# Patient Record
Sex: FEMALE | Race: NATIVE HAWAIIAN OR OTHER PACIFIC ISLANDER | Employment: UNEMPLOYED | URBAN - METROPOLITAN AREA
[De-identification: names, ages, dates, MRNs, and addresses within clinical notes are randomized per-mention and may not be internally consistent; named-entity substitution may affect disease eponyms.]

---

## 2020-07-21 ENCOUNTER — OFFICE VISIT (OUTPATIENT)
Dept: PRIMARY CARE CLINIC | Age: 70
End: 2020-07-21

## 2020-07-21 PROCEDURE — 99211 OFF/OP EST MAY X REQ PHY/QHP: CPT | Performed by: NURSE PRACTITIONER

## 2020-07-21 NOTE — PROGRESS NOTES
Lalobetty Marce received a viral test for COVID-19. They were educated on isolation and quarantine as appropriate. For any symptoms, they were directed to seek care from their PCP, given contact information to establish with a doctor, directed to an urgent care or the emergency room.

## 2020-07-28 LAB
SARS-COV-2: NOT DETECTED
SOURCE: NORMAL

## 2022-01-20 ENCOUNTER — OFFICE VISIT (OUTPATIENT)
Dept: ORTHOPEDIC SURGERY | Age: 72
End: 2022-01-20
Payer: COMMERCIAL

## 2022-01-20 VITALS — HEIGHT: 62 IN | WEIGHT: 130 LBS | BODY MASS INDEX: 23.92 KG/M2

## 2022-01-20 DIAGNOSIS — M17.11 PRIMARY OSTEOARTHRITIS OF RIGHT KNEE: ICD-10-CM

## 2022-01-20 DIAGNOSIS — M22.41 CHONDROMALACIA PATELLAE OF RIGHT KNEE: ICD-10-CM

## 2022-01-20 DIAGNOSIS — M25.561 ACUTE PAIN OF RIGHT KNEE: Primary | ICD-10-CM

## 2022-01-20 PROCEDURE — L1810 KO ELASTIC WITH JOINTS: HCPCS | Performed by: FAMILY MEDICINE

## 2022-01-20 PROCEDURE — G8400 PT W/DXA NO RESULTS DOC: HCPCS | Performed by: FAMILY MEDICINE

## 2022-01-20 PROCEDURE — G8484 FLU IMMUNIZE NO ADMIN: HCPCS | Performed by: FAMILY MEDICINE

## 2022-01-20 PROCEDURE — G8427 DOCREV CUR MEDS BY ELIG CLIN: HCPCS | Performed by: FAMILY MEDICINE

## 2022-01-20 PROCEDURE — 1123F ACP DISCUSS/DSCN MKR DOCD: CPT | Performed by: FAMILY MEDICINE

## 2022-01-20 PROCEDURE — 4040F PNEUMOC VAC/ADMIN/RCVD: CPT | Performed by: FAMILY MEDICINE

## 2022-01-20 PROCEDURE — 99203 OFFICE O/P NEW LOW 30 MIN: CPT | Performed by: FAMILY MEDICINE

## 2022-01-20 PROCEDURE — 1090F PRES/ABSN URINE INCON ASSESS: CPT | Performed by: FAMILY MEDICINE

## 2022-01-20 PROCEDURE — 20610 DRAIN/INJ JOINT/BURSA W/O US: CPT | Performed by: FAMILY MEDICINE

## 2022-01-20 PROCEDURE — 3017F COLORECTAL CA SCREEN DOC REV: CPT | Performed by: FAMILY MEDICINE

## 2022-01-20 PROCEDURE — G8420 CALC BMI NORM PARAMETERS: HCPCS | Performed by: FAMILY MEDICINE

## 2022-01-20 PROCEDURE — 1036F TOBACCO NON-USER: CPT | Performed by: FAMILY MEDICINE

## 2022-01-20 RX ORDER — MELOXICAM 15 MG/1
15 TABLET ORAL DAILY
Qty: 30 TABLET | Refills: 3 | Status: SHIPPED | OUTPATIENT
Start: 2022-01-20

## 2022-01-20 RX ORDER — BETAMETHASONE SODIUM PHOSPHATE AND BETAMETHASONE ACETATE 3; 3 MG/ML; MG/ML
12 INJECTION, SUSPENSION INTRA-ARTICULAR; INTRALESIONAL; INTRAMUSCULAR; SOFT TISSUE ONCE
Status: COMPLETED | OUTPATIENT
Start: 2022-01-20 | End: 2022-01-20

## 2022-01-20 RX ORDER — ALENDRONATE SODIUM 35 MG/1
35 TABLET ORAL
COMMUNITY
Start: 2021-10-11

## 2022-01-20 RX ORDER — BUPIVACAINE HYDROCHLORIDE 2.5 MG/ML
2 INJECTION, SOLUTION INFILTRATION; PERINEURAL ONCE
Status: COMPLETED | OUTPATIENT
Start: 2022-01-20 | End: 2022-01-20

## 2022-01-20 RX ORDER — LIDOCAINE HYDROCHLORIDE 10 MG/ML
1 INJECTION, SOLUTION INFILTRATION; PERINEURAL ONCE
Status: COMPLETED | OUTPATIENT
Start: 2022-01-20 | End: 2022-01-20

## 2022-01-20 RX ORDER — MELOXICAM 15 MG/1
15 TABLET ORAL DAILY
COMMUNITY
Start: 2021-08-24

## 2022-01-20 RX ADMIN — LIDOCAINE HYDROCHLORIDE 1 ML: 10 INJECTION, SOLUTION INFILTRATION; PERINEURAL at 13:44

## 2022-01-20 RX ADMIN — BETAMETHASONE SODIUM PHOSPHATE AND BETAMETHASONE ACETATE 12 MG: 3; 3 INJECTION, SUSPENSION INTRA-ARTICULAR; INTRALESIONAL; INTRAMUSCULAR; SOFT TISSUE at 13:44

## 2022-01-20 RX ADMIN — BUPIVACAINE HYDROCHLORIDE 5 MG: 2.5 INJECTION, SOLUTION INFILTRATION; PERINEURAL at 13:44

## 2022-01-20 NOTE — PROGRESS NOTES
Chief Complaint  Knee Pain (N RIGHT KNEE)      Initial consultation persistent right anterior and lateral greater than medial knee pain with difficulty walking    History of Present Illness:  Tracy Ramirez is a 80 y.o. female is a very pleasant  female who speaks primarily [place] who is recently retired after working in a [employer]. and is a very nice patient of Dr. Andrew Charlton who is being seen today in kind consultation from Dr. Andrew Charlton for evaluation of persistence of pain to the right anterior and lateral greater than medial knee pain. She states that since roughly October 2021, she began noticed the insidious onset of pain to the anterior lateral greater than medial portion of her right knee. There is no history of injury trauma or fall prior to becoming symptomatic. She describes her pain is more achy but is a difficult time rating this with more of her sharp pain associated with positional changes after sitting climbing stairs and if she is unable to squat kneel due to pain. She did recently see Dr. Andrew Charlton in the office who did obtain a 3 view knee films which did suggest more mild arthropathy. She has had little in the way of treatment and does complain of crepitation and popping and has had subtle buckling and cracking but no active locking or catching. Being seen today for orthopedic and sports consultation with updated weightbearing imaging. Medical History     Patient's medications, allergies, past medical, surgical, social and family histories were reviewed and updated as appropriate. Review of Systems  Pertinent items are noted in HPI  Review of systems reviewed from Patient History Form dated on 1/20/2022 and available in the patient's chart under the Media tab. Vital Signs  There were no vitals filed for this visit.     General Exam:     Constitutional: Patient is adequately groomed with no evidence of malnutrition  DTRs: Deep tendon reflexes are intact  Mental Status: The patient is oriented to time, place and person. The patient's mood and affect are appropriate. Lymphatic: The lymphatic examination bilaterally reveals all areas to be without enlargement or induration. Vascular: Examination reveals no swelling or calf tenderness. Peripheral pulses are palpable and 2+. Neurological: The patient has good coordination. There is no weakness or sensory deficit. Knee Examination  Inspection: There is no high-grade deformity although she may be in slight valgus to about 10 degrees. Does have trace knee joint effusion with patellofemoral lateral compartment crepitation. Palpation: Does have clinical tenderness over the lateral greater than medial joint line remarkable for crepitation and tenderness over the medial and lateral patellofemoral facets. Rang of Motion: She is able to completely extend and flexion is mildly painful beyond 115. Hamstrings somewhat tight. Fair quad tone. Strength: Strength testing 4-5 with flexion extension. Special Tests: She does have pain patellar grind testing reproducing portion of her pain and pain with crepitation with lateral greater than medial Robyn's. No high-grade medial meniscal click. I do not sense high-grade instability. Hamstrings are tight. Mild hip weakness as well. Skin: There are no rashes, ulcerations or lesions. Distal neurovascular exam is intact. Gait: Reasonable gait. Reflex symmetrically preserved    Additional Comments:     Additional Examinations:  Contralateral Exam: Examination of the left knee reveals intact skin. There is no focal tenderness. The patient demonstrates full painless range of motion with regards to flexion and extension. Strength is 5/5 thorough out all planes. Ligamentous stability is grossly intact. Right Lower Extremity: Examination of the right lower extremity does not show any tenderness, deformity or injury.   Range of motion is unremarkable. There is no gross instability. There are no rashes, ulcerations or lesions. Strength and tone are normal.  Left Lower Extremity: Examination of the left lower extremity does not show any tenderness, deformity or injury. Range of motion is unremarkable. There is no gross instability. There are no rashes, ulcerations or lesions. Strength and tone are normal.      Diagnostic Test Findings: Right knee AP lateral and sunrise films were reviewed from Dr. Joann Swain office at [facility] on 1/11/2022 does suggest patellofemoral arthropathy with some patella amy as well as lateral compartment spurring. We did do a weightbearing tunnel view today which does show fairly advanced near bone-on-bone changes to her lateral compartment. Assessment : #1.  3-month status post symptomatic aggravation significant her right knee osteoarthritis with near bone-on-bone changes lateral compartment with patellofemoral arthropathy    Impression:  Encounter Diagnoses   Name Primary?  Acute pain of right knee Yes    Primary osteoarthritis of right knee     Chondromalacia patellae of right knee        Office Procedures:  Orders Placed This Encounter   Procedures    XR KNEE RIGHT (1-2 VIEWS)     Standing Status:   Future     Number of Occurrences:   1     Standing Expiration Date:   1/20/2023    Ambulatory referral to Physical Therapy     Referral Priority:   Routine     Referral Type:   Eval and Treat     Referral Reason:   Specialty Services Required     Requested Specialty:   Physical Therapy     Number of Visits Requested:   1    W[place] ARTHROCENTESIS ASPIR&/INJ MAJOR JT/BURSA W/O US    EUFLEXXA INJ PER DOSE     RIGHT KNEE     Standing Status:   Future     Standing Expiration Date:   1/20/2023   Fernandez Breg / DJO Economy Hinged Knee Brace     Patient was prescribed an Economy Hinged Knee Brace.  The right knee will require stabilization / immobilization from this semi-rigid / rigid orthosis to improve their function. The orthosis will assist in protecting the affected area, provide functional support and facilitate healing. The patient was educated and fit by a healthcare professional with expert knowledge and specialization in brace application while under the direct supervision of the treating physician. Verbal and written instructions for the use of and application of this item were provided. They were instructed to contact the office immediately should the brace result in increased pain, decreased sensation, increased swelling or worsening of the condition. Treatment Plan:  Treatment options were discussed with Ana Paula Kirkpatrick. We did review her updated weightbearing knee films and exam findings. She does have fairly advanced lateral compartment arthropathy with symptomatic patellofemoral arthropathy to her right knee. Her degenerative changes are much more apparent on her weightbearing tunnel view. After discussing potential treatment options, we did inject her right knee today using 2 cc of Celestone, 2 cc of Marcaine, 1 cc of Xylocaine. We will start her on meloxicam 15 mg daily and did place her a wraparound knee brace. We will start her on a short course of physical therapy but it may be advisable to have a  present for therapy sessions to ensure that she is properly performing her patella protection program.  Icing and activity modification and potential for viscosupplementation was also discussed. We will see her back in a few weeks for follow-up. They will contact us in the interim with questions or concerns would like to avoid surgery if at all possible        This dictation was performed with a verbal recognition program (DRAGON) and it was checked for errors. It is possible that there are still dictated errors within this office note. If so, please bring any errors to my attention for an addendum. All efforts were made to ensure that this office note is accurate.

## 2022-01-24 ENCOUNTER — HOSPITAL ENCOUNTER (OUTPATIENT)
Dept: PHYSICAL THERAPY | Age: 72
Setting detail: THERAPIES SERIES
Discharge: HOME | End: 2022-01-24
Payer: COMMERCIAL

## 2022-01-24 PROCEDURE — 97110 THERAPEUTIC EXERCISES: CPT | Performed by: PHYSICAL THERAPIST

## 2022-01-24 PROCEDURE — 97112 NEUROMUSCULAR REEDUCATION: CPT | Performed by: PHYSICAL THERAPIST

## 2022-01-24 PROCEDURE — 97161 PT EVAL LOW COMPLEX 20 MIN: CPT | Performed by: PHYSICAL THERAPIST

## 2022-01-24 NOTE — PLAN OF CARE
[facility], [address] [place] [address], [address] [place] [address]  Phone: [phone]   Fax: [phone]                                                              Physical Therapy Certification    Dear Referring Practitioner: Kenny Hendrix MD,    We had the pleasure of evaluating the following patient for physical therapy services at [address] [place] [address]. A summary of our findings can be found in the initial assessment below. This includes our plan of care. If you have any questions or concerns regarding these findings, please do not hesitate to contact me at the office phone number checked above.   Thank you for the referral.       Physician Signature:_______________________________Date:__________________  By signing above (or electronic signature), therapists plan is approved by physician      Patient: Tracy Ramirez   : 1950   MRN: [id number]  Referring Physician: Referring Practitioner: Kenny Hendrix MD      Evaluation Date: 2022      Medical Diagnosis Information:  Diagnosis: M25.561 (ICD-10-CM) - Acute pain of right kneeM17.11 (ICD-10-CM) - Primary osteoarthritis of right kneeM22.41 (ICD-10-CM) - Chondromalacia patellae of right knee   Treatment Diagnosis: M25.561 (ICD-10-CM) - Acute pain of right knee; M62.81 muscle weakness                                           Precautions/ Contra-indications: OA; Current R sided low back pain into her R glute    C-SSRS Triggered by Intake questionnaire (Past 2 wk assessment):   [x] No, Questionnaire did not trigger screening.   [] Yes, Patient intake triggered further evaluation      [] C-SSRS Screening completed  [] PCP notified via Plan of Care  [] Emergency services notified     Latex Allergy:  [x]NO      []YES  Preferred Language for Healthcare:   []English       [x]other: [place]- using Ray Martinez (COLILI #[id number], session [id number]) for eval and tx    SUBJECTIVE: Patient stated complaint: pt arrives with  today. Pt speaks [place] so  through [employer] was utilized throughout eval and tx. Pt presents with R knee pain. Pain has been present since Oct 2021. No injury or trauma. Per MD note: Right knee AP lateral and sunrise films were reviewed from Dr. Sandy Siddiqui office at [facility] on 1/11/2022 does suggest patellofemoral arthropathy with some patella amy as well as lateral compartment spurring. We did do a weightbearing tunnel view today which does show fairly advanced near bone-on-bone changes to her lateral compartment. Pt was given a cortisone injection at MD appt and this did help. She was also given a wrap around knee brace per MD note but was not wearing today. Pt also complains of tightness in her calves that has been going on also since last year. She feels this more when she is standing for a long time doing house work. She has been stretching her calves and reports she has had no increase in swelling in feet or lower legs. Relevant Medical History:OA; Current R sided low back pain into her R glute  Functional Disability Index: WOMAC- 76.04%  Relevant Meds:   Meloxicam  Current pain: 6/10 Both sides of R knee and front of knee  Pain at worst:  6/10  Pain at best:  Does get less with medication    Easing factors: medications and cortisone injection  Provocative factors:  climbing stairs, squat, kneel, walking, standing for a long period of time. Feels like her knee is locked at night when she sleeps      Type: [x]Constant   []Intermittent  []Radiating [x]Localized []other:     Numbness/Tingling: none    Functional Limitations/Impairments: []Sitting []Standing [x]Walking    [x]Squatting/bending  [x]Stairs           []ADL's  [x]Transfers []Sports/Recreation []Other:    Occupation/School: retired after working in [employer] ..  Cooking and cleaning at home (will get tight in her calves with this)     Living Status/Prior Level of Function: Independent with ADLs and IADLs, without pain in R knee  (insert highest prior level of function)    OBJECTIVE:     Joint mobility:    [x]Normal    []Hypo   []Hyper    Palpation: mild tender throughout R anterior knee, medial and lateral to patella. Non tender posterior knee.  Tender lateral hip proximal to greater trochanter    Functional Mobility/Transfers: Indep    Posture: mild increased knee valgus    Bandages/Dressings/Incisions: n/a    Gait: (include devices/WB status) Indep and non-antalgic    Single leg stance eyes open- R: 30 secs with HHA ;L: 30 secs with HHA no pain B   SLS eyes closed- R: ; L:     Squats: x 5 with good form    Quad recruitment: decreased tone and activation B        Flexibility L R Comment   Hamstring good good 90/90 position   Gastroc Good motion but pt reports feels tight Good motion but pt reports feels tight    ITB      Quad 1 in No pain in knee but pain into R side low back, 3 in Heel to buttock distance             ROM PROM AROM Overpressure Comment    L R L R L R    Flexion   140 138      Extension   3 3                              Strength L R Comment   Quad 5 4+ mild pain posterior-lateral R hip    Hamstring 5 4+    Gastroc      Hip flexor 4+ 4; Mild pain posterior-lateral R hip    Hip ABD 4 4    Hip extn              Orthopedic Special Tests:  Special Test Results/Comment   Meniscal Click/Robyn's    Crepitus    Flexion Test    Valgus Laxity    Varus Laxity    Lachmans    Drop Back    Homans Wells Clinical Decision Rule for DVT     Clinical Presentation  Possible Score  Clients Score    Active cancer (within 6 months of diagnosis or receiving palliative care)  1   0   Paralysis, paresis, or recent immobilization of lower extremity  1   0   Bedridden for more than 3 days or major surgery in the last 4 weeks  1   0   Localized tenderness in the center of the posterior calf, the popliteal space, or along the femoral vein in the anterior thigh/groin  1   0 not localized   Entire lower extremity swelling  1   0   Unilateral calf swelling (more than 3 cm larger than uninvolved side)  1   0   Unilateral pitting edema  1   0   Collateral superficial veins (nonvaricose)  1   varicose B   An alternative diagnosis is as likely (or more likely) than DVT (e.g., cellulitis, postoperative swelling, calf strain)  -2   -2   Total Points    -2      Key  · -2 to 0 Low probability of DVT 3% (95% confidence interval [CI] 1.7%-5.9%)  · 1 to 2 Moderate probability of DVT 17% (95% confidence interval [CI] 12%-23%)  · 3 or more High probability of DVT 75% (95% confidence interval [CI] 63%-84%)     Medical consultation is advised in the presence of low probability  Medical referral is required with moderate or high score. Sterling PS, Herman CAMEJO, Chasidy J, et al: Value of assessment of pretest probability of deep-vein thrombosis in clinical management, Lancet 287:2000-7982, 1997. Girth L R   Mid Patella     Suprapatellar     5cm above     15cm above                              [x] Patient history, allergies, meds reviewed. Medical chart reviewed. See intake form. Review Of Systems (ROS):  [x]Performed Review of systems (Integumentary, CardioPulmonary, Neurological) by intake and observation. Intake form has been scanned into medical record. Patient has been instructed to contact their primary care physician regarding ROS issues if not already being addressed at this time.       Co-morbidities/Complexities (which will affect course of rehabilitation):   []None           Arthritic conditions   []Rheumatoid arthritis (M05.9)  [x]Osteoarthritis (M19.91)   Cardiovascular conditions   []Hypertension (I10)  []Hyperlipidemia (E78.5)  []Angina pectoris (I20)  []Atherosclerosis (I70)   Musculoskeletal conditions   []Disc pathology   []Congenital spine pathologies   []Prior surgical intervention  []Osteoporosis (M81.8)  []Osteopenia (M85.8) Endocrine conditions   []Hypothyroid (E03.9)  []Hyperthyroid Gastrointestinal conditions   []Constipation (F59.38)   Metabolic conditions   []Morbid obesity (E66.01)  []Diabetes type 1(E10.65) or 2 (E11.65)   []Neuropathy (G60.9)     Pulmonary conditions   []Asthma (J45)  []Coughing   []COPD (J44.9)   Psychological Disorders  []Anxiety (F41.9)  []Depression (F32.9)   []Other:   []Other:          Barriers to/and or personal factors that will affect rehab potential:              [x]Age  []Sex              []Motivation/Lack of Motivation                        []Co-Morbidities              [x]Cognitive Function, education/learning barriers- language barrier/required use of                []Environmental, home barriers              []profession/work barriers  []past PT/medical experience  []other:  Justification:     Falls Risk Assessment (30 days):   [x] Falls Risk assessed and no intervention required. [] Falls Risk assessed and Patient requires intervention due to being higher risk   TUG score (>12s at risk):     [] Falls education provided, including         ASSESSMENT: Pt is a 80y.o. year old female with signs and symptoms consistent with R knee OA and chondromalacia patella. Pt presents with decreased strength; increased pain; decreased flexibility; and decreased functional mobility and ADLs. Pt will benefit from skilled physical therapy services to address above limitations through strengthening, stretching, modalities as need for pain control and swelling, instruction on HEP, and education for return to functional mobility.      Functional Impairments:     []Noted lumbar/proximal hip/LE joint hypomobility   []Decreased LE functional ROM   [x]Decreased core/proximal hip strength and neuromuscular control   [x]Decreased LE functional strength   [x]Reduced balance/proprioceptive control   []other:      Functional Activity Limitations (from functional questionnaire and intake)   [x]Reduced ability to tolerate prolonged functional positions   [x]Reduced ability or difficulty with changes of positions or transfers between positions   []Reduced ability to maintain good posture and demonstrate good body mechanics with sitting, bending, and lifting   [x]Reduced ability to sleep   [] Reduced ability or tolerance with driving and/or computer work   [x]Reduced ability to perform lifting, carrying tasks   [x]Reduced ability to squat   []Reduced ability to forward bend   [x]Reduced ability to ambulate prolonged functional periods/distances/surfaces   [x]Reduced ability to ascend/descend stairs   [x]Reduced ability to run, hop, cut or jump   []other:    Participation Restrictions   [x]Reduced participation in self care activities   [x]Reduced participation in home management activities   []Reduced participation in work activities   []Reduced participation in social activities. []Reduced participation in sport/recreation activities. Classification :    []Signs/symptoms consistent with post-surgical status including decreased ROM, strength and function.    []Signs/symptoms consistent with joint sprain/strain   [x]Signs/symptoms consistent with patella-femoral syndrome   [x]Signs/symptoms consistent with knee OA/hip OA   []Signs/symptoms consistent with internal derangement of knee/Hip   []Signs/symptoms consistent with functional hip weakness/NMR control      []Signs/symptoms consistent with tendinitis/tendinosis    []signs/symptoms consistent with pathology which may benefit from Dry needling      []other:      Prognosis/Rehab Potential:      []Excellent   [x]Good    []Fair   []Poor    Tolerance of evaluation/treatment:    []Excellent   [x]Good    []Fair   []Poor    PLAN  Frequency/Duration:  1 days per week for 6 Weeks:  Interventions:  [x]  Therapeutic exercise including: strength training, ROM, for Lower extremity and core   [x]  NMR activation and proprioception for LE, Glutes and Core   [x]  Manual therapy as indicated for LE, Hip and spine to include: Dry Needling/IASTM, STM, PROM, Gr I-IV mobilizations, manipulation. [x] Modalities as needed that may include: thermal agents, E-stim, Biofeedback, US, iontophoresis as indicated  [x] Patient education on joint protection, postural re-education, activity modification, progressio      HEP instruction: Pt was instructed in, and safely and correctly demonstrated home exercise program. Patient verbalized understanding of proper frequency of exercises. Copy of exercises was scanned into patient chart and can be fount in the media file. Access Code: [email or web address]  URL: [email or web address]  Date: 01/24/2022  Prepared by: Iglesia Cormier    Exercises  Gastroc Stretch on Wall - 2 x daily - 7 x weekly - 5 reps - 1 sets - 30 hold  Standing Gastroc Stretch on Step with Counter Support - 2 x daily - 7 x weekly - 1 sets - 5 reps - 30 hold  Long Sitting Quad Set - 2 x daily - 7 x weekly - 1 sets - 10 reps - 10 hold  Supine Straight Leg Raises - 1 x daily - 7 x weekly - 10 reps - 3 sets  Clamshell - 1 x daily - 7 x weekly - 10 reps - 3 sets  Supine Hip Adduction Isometric with Ball - 1 x daily - 7 x weekly - 10 reps - 1 sets - 10 hold  Standing Hip Abduction with Counter Support - 1 x daily - 7 x weekly - 3 sets - 10 reps      GOALS:  Patient stated goal: decrease pain in knee    Short Term Goals: To be achieved in: 2 weeks  1. Independent in HEP and progression per patient tolerance, in order to prevent re-injury. [] Progressing: [] Met: [] Not Met: [] Adjusted   2. Patient will have a decrease in pain to facilitate improvement in movement, function, and ADLs as indicated by Functional Deficits. [] Progressing: [] Met: [] Not Met: [] Adjusted    Long Term Goals: To be achieved in: 6 weeks  1. Disability index score of 62% or less for the [facility] to assist with reaching prior level of function. [] Progressing: [] Met: [] Not Met: [] Adjusted  2.  Patient will demonstrate an increase in R knee strength to 5/5 flex and extn in LE to allow for proper functional mobility as indicated by patients Functional Deficits. [] Progressing: [] Met: [] Not Met: [] Adjusted  3. Patient will return to laying in bed at night with pain less than 3/10. [] Progressing: [] Met: [] Not Met: [] Adjusted  4. Pt will return to going up and down stairs with pain less than 3/10  [] Progressing: [] Met: [] Not Met: [] Adjusted       Physical Therapy Evaluation Complexity Justification  [x] A history of present problem with:  [] no personal factors and/or comorbidities that impact the plan of care;  [x]1-2 personal factors and/or comorbidities that impact the plan of care  []3 personal factors and/or comorbidities that impact the plan of care  [x] An examination of body systems using standardized tests and measures addressing any of the following: body structures and functions (impairments), activity limitations, and/or participation restrictions;:  [] a total of 1-2 or more elements   [] a total of 3 or more elements   [x] a total of 4 or more elements   [x] A clinical presentation with:  [x] stable and/or uncomplicated characteristics   [] evolving clinical presentation with changing characteristics  [] unstable and unpredictable characteristics;   [x] Clinical decision making of [x] low, [] moderate, [] high complexity using standardized patient assessment instrument and/or measurable assessment of functional outcome.     [x] EVAL (LOW) 89412 (typically 20 minutes face-to-face)  [] EVAL (MOD) 04574 (typically 30 minutes face-to-face)  [] EVAL (HIGH) 92096 (typically 45 minutes face-to-face)  [] RE-EVAL 73561      Electronically signed by:  Pietro Coyle, PT, PT, DPT

## 2022-01-24 NOTE — FLOWSHEET NOTE
[address] [place] [address]  and [facility], [place]                                                         Physical Therapy Daily Treatment Note  Date:  2022    Patient Name:  Pepe Arnold    :  1950  MRN: [id number]    Medical/Treatment Diagnosis Information:  · Diagnosis: M25.561 (ICD-10-CM) - Acute pain of right kneeM17.11 (ICD-10-CM) - Primary osteoarthritis of right kneeM22.41 (ICD-10-CM) - Chondromalacia patellae of right knee  · Treatment Diagnosis: M25.561 (ICD-10-CM) - Acute pain of right knee; M62.81 muscle weakness  Insurance/Certification information:  PT Insurance Information: [employer]  Physician Information:  Referring Practitioner: Oscar Haywood MD  Has the plan of care been signed (Y/N):        []  Yes  [x]  No     Date of Patient follow up with Physician: few weeks      Is this a Progress Report:     []  Yes  [x]  No        If Yes:  Date Range for reporting period:  Beginning- 2022   Ending    Progress report will be due (10 Rx or 30 days whichever is less): 10 visits or 3/51/70       Recertification will be due (POC Duration  / 90 days whichever is less): as above        Visit # Insurance Allowable Auth Required   1 30, needs auth after 30 visits []  Yes [x]  No        Functional Scale: WOMAC- 76.04%    Date assessed:  2022      Latex Allergy:  [x]NO      []YES  Preferred Language for Healthcare:   []English       [x]other:Cambodian- using [employer] ([facility] #[id number], session [id number]) for eval and tx      Pain level:  6/10  on 2022    SUBJECTIVE:  See eval    OBJECTIVE: See eval   Observation:    Test measurements:      Joint mobility:               [x]? Normal                       []?Hypo              []?Hyper     Palpation: mild tender throughout R anterior knee, medial and lateral to patella. Non tender posterior knee.  Tender lateral hip proximal to greater trochanter     Functional Mobility/Transfers: Indep     Posture: mild increased knee valgus     Bandages/Dressings/Incisions: n/a     Gait: (include devices/WB status) Indep and non-antalgic     Single leg stance eyes open- R: 30 secs with HHA ;L: 30 secs with HHA no pain B              SLS eyes closed- R: ; L:      Squats: x 5 with good form     Quad recruitment: decreased tone and activation B           Flexibility L R Comment   Hamstring good good 90/90 position   Gastroc Good motion but pt reports feels tight Good motion but pt reports feels tight     ITB         Quad 1 in No pain in knee but pain into R side low back, 3 in Heel to buttock distance                             ROM PROM AROM Overpressure Comment     L R L R L R     Flexion     140 138         Extension     3 3                                                   Strength L R Comment   Quad 5 4+ mild pain posterior-lateral R hip     Hamstring 5 4+     Gastroc         Hip flexor 4+ 4; Mild pain posterior-lateral R hip     Hip ABD 4 4     Hip extn                      RESTRICTIONS/PRECAUTIONS: OA; Current R sided low back pain into her R glute    Exercises/Interventions:     Exercise/Equipment Resistance/Repetitions Other comments   Stretching     Hamstring     Hip Flexor     ITB     Grion     Quad     Inclined Calf Shown runners stretch or off side of step for HEP 3 x 30 secs    Towel Pull          SLR     Hip flexion 3 x 10 Increased cuing to keep knee straight, difficult for pt to maintain   Hip extension     Hip Abduction Standing 3 x 10 B    Hip Adducton     SLR+     Clams 3 x 10 R                   Isometrics     Quad sets 5 x 10 secs    Hip Adduction 5 x 10 secs    Hamstring                    Patellar Glides     Medial     Superior     Inferior          ROM     Sheet Pulls     Wall Slides     Edge of bed     Flexionator     Extensionator     Hang Weights     Ankle Pumps                              CKC     Calf raises     Wall sits     Step ups Step up and over     Lateral Step Downs               Mini squats     CC TKE          Lateral band walks     Side Planks     Half moon     Single leg dead lift          Biodex-balance     Single leg stance     Plyoback          Stool scoots     SB bridge     SB HS Curls     Planks          PRE     Extension  RANGE: 90/40   Flexion  RANGE: 0/90        Cable Column          Leg Press  RANGE: 70/10        Bike     Treadmill                 Patient education: Pt was educated on PT diagnosis, prognosis, and plan of care. Reviewed insurance benefits for physical therapy in an outpatient hospital based setting with the patient, including allowable visit number. Therapeutic Exercise and NMR EXR  [x] (76196) Provided verbal/tactile cueing for activities related to strengthening, flexibility, endurance, ROM for improvements in LE, proximal hip, and core control with self care, mobility, lifting, ambulation.  [] (86629) Provided verbal/tactile cueing for activities related to improving balance, coordination, kinesthetic sense, posture, motor skill, proprioception  to assist with LE, proximal hip, and core control in self care, mobility, lifting, ambulation and eccentric single leg control.      NMR and Therapeutic Activities:    [x] (64726 or 09275) Provided verbal/tactile cueing for activities related to improving balance, coordination, kinesthetic sense, posture, motor skill, proprioception and motor activation to allow for proper function of core, proximal hip and LE with self care and ADLs  [] (18723) Gait Re-education- Provided training and instruction to the patient for proper LE, core and proximal hip recruitment and positioning and eccentric body weight control with ambulation re-education including up and down stairs     Home Exercise Program:    [x] (85302) Reviewed/Progressed HEP activities related to strengthening, flexibility, endurance, ROM of core, proximal hip and LE for functional self-care, mobility, lifting and ambulation/stair navigation   [] (95478)Reviewed/Progressed HEP activities related to improving balance, coordination, kinesthetic sense, posture, motor skill, proprioception of core, proximal hip and LE for self care, mobility, lifting, and ambulation/stair navigation    Access Code: [email or web address]  URL: [email or web address]  Date: 01/24/2022  Prepared by: Marlene Baum     Exercises  Gastroc Stretch on Wall - 2 x daily - 7 x weekly - 5 reps - 1 sets - 30 hold  Standing Gastroc Stretch on Step with Counter Support - 2 x daily - 7 x weekly - 1 sets - 5 reps - 30 hold  Long Sitting Quad Set - 2 x daily - 7 x weekly - 1 sets - 10 reps - 10 hold  Supine Straight Leg Raises - 1 x daily - 7 x weekly - 10 reps - 3 sets  Clamshell - 1 x daily - 7 x weekly - 10 reps - 3 sets  Supine Hip Adduction Isometric with Ball - 1 x daily - 7 x weekly - 10 reps - 1 sets - 10 hold  Standing Hip Abduction with Counter Support - 1 x daily - 7 x weekly - 3 sets - 10 reps  Manual Treatments:  PROM / STM / Oscillations-Mobs:  G-I, II, III, IV (PA's, Inf., Post.)  [] (97632) Provided manual therapy to mobilize LE, proximal hip and/or LS spine soft tissue/joints for the purpose of modulating pain, promoting relaxation,  increasing ROM, reducing/eliminating soft tissue swelling/inflammation/restriction, improving soft tissue extensibility and allowing for proper ROM for normal function with self care, mobility, lifting and ambulation. Modalities:      Charges:  Timed Code Treatment Minutes: 25   Total Treatment Minutes: 58     [x] EVAL (LOW) 39319   [] EVAL (MOD) 86602   [] EVAL (HIGH) 62277   [] RE-EVAL   [x] UV(24339) x   1  [] IONTO  [x] NMR (21008) x  1   [] VASO  [] Manual (33484) x      [] Other:  [] TA x      [] Mech Traction (21049)  [] ES(attended) (54389)      [] ES (un) (07825):       GOALS: Patient stated goal: decrease pain in knee     Short Term Goals: To be achieved in: 2 weeks  1.  Independent in HEP and progression per patient tolerance, in order to prevent re-injury. []? Progressing: []? Met: []? Not Met: []? Adjusted   2. Patient will have a decrease in pain to facilitate improvement in movement, function, and ADLs as indicated by Functional Deficits. []? Progressing: []? Met: []? Not Met: []? Adjusted     Long Term Goals: To be achieved in: 6 weeks  1. Disability index score of 62% or less for the [facility] to assist with reaching prior level of function. []? Progressing: []? Met: []? Not Met: []? Adjusted  2. Patient will demonstrate an increase in R knee strength to 5/5 flex and extn in LE to allow for proper functional mobility as indicated by patients Functional Deficits. []? Progressing: []? Met: []? Not Met: []? Adjusted  3. Patient will return to laying in bed at night with pain less than 3/10. []? Progressing: []? Met: []? Not Met: []? Adjusted  4. Pt will return to going up and down stairs with pain less than 3/10  []? Progressing: []? Met: []? Not Met: []? Adjusted          Overall Progression Towards Functional goals/ Treatment Progress Update:  [] Patient is progressing as expected towards functional goals listed. [] Progression is slowed due to complexities/Impairments listed. [] Progression has been slowed due to co-morbidities.   [x] Plan just implemented, too soon to assess goals progression <30days   [] Goals require adjustment due to lack of progress  [] Patient is not progressing as expected and requires additional follow up with physician  [] Other    Prognosis for POC: [x] Good [] Fair  [] Poor      Patient requires continued skilled intervention: [x] Yes  [] No      ASSESSMENT:  See eval    Treatment/Activity Tolerance:  [x] Patient tolerated treatment well [] Patient limited by fatique  [] Patient limited by pain  [] Patient limited by other medical complications  [] Other:       Return to Play: (if applicable)   []  Stage 1: Intro to Strength   []  Stage 2: Return to [facility] and Strength   []  Stage 3: Return to Jump and Strength   []  Stage 4: Dynamic Strength and Agility   []  Stage 5: Sport Specific Training     []  Ready to Return to Play, Meets All Above Stages   []  Not Ready for Return to Sports   Comments:            Prognosis: [x] Good [] Fair  [] Poor    Patient Requires Follow-up: [x] Yes  [] No    PLAN: See eval; consider SL balance, bridges, mini squats  [] Continue per plan of care [] Alter current plan (see comments above)  [x] Plan of care initiated [] Hold pending MD visit [] Discharge    Note: If patient does not return for scheduled/ recommended follow up visits, this note will serve as a discharge from care along with most recent update on progress.       Electronically signed by: Anastacia Carrion PT PT, DPT

## 2022-01-27 ENCOUNTER — TELEPHONE (OUTPATIENT)
Dept: ORTHOPEDIC SURGERY | Age: 72
End: 2022-01-27

## 2022-01-27 NOTE — TELEPHONE ENCOUNTER
LVM for patient that euflexxa injections have been approved for right knee. Told patient they could call central scheduling at [phone] at their convenience to schedule those appointments.  Also told them if they had any problems or questions, they could call me directly at [phone]    *WILL NEED AN INTREPRETER

## 2022-01-31 ENCOUNTER — HOSPITAL ENCOUNTER (OUTPATIENT)
Dept: PHYSICAL THERAPY | Age: 72
Setting detail: THERAPIES SERIES
Discharge: HOME | End: 2022-01-31
Payer: COMMERCIAL

## 2022-01-31 PROCEDURE — 97530 THERAPEUTIC ACTIVITIES: CPT | Performed by: PHYSICAL THERAPIST

## 2022-01-31 PROCEDURE — 97112 NEUROMUSCULAR REEDUCATION: CPT | Performed by: PHYSICAL THERAPIST

## 2022-01-31 PROCEDURE — 97110 THERAPEUTIC EXERCISES: CPT | Performed by: PHYSICAL THERAPIST

## 2022-01-31 NOTE — FLOWSHEET NOTE
[facility] and [facility], [place]                                                         Physical Therapy Daily Treatment Note  Date:  2022    Patient Name:  Mandeep Nowak    :  1950  MRN: [id number]    Medical/Treatment Diagnosis Information:  · Diagnosis: M25.561 (ICD-10-CM) - Acute pain of right kneeM17.11 (ICD-10-CM) - Primary osteoarthritis of right kneeM22.41 (ICD-10-CM) - Chondromalacia patellae of right knee  · Treatment Diagnosis: M25.561 (ICD-10-CM) - Acute pain of right knee; M62.81 muscle weakness  Insurance/Certification information:  PT Insurance Information: [email or web address]  Physician Information:  Referring Practitioner: Capo Medrano MD  Has the plan of care been signed (Y/N):        [x]  Yes  [x]  No     Date of Patient follow up with Physician: few weeks      Is this a Progress Report:     []  Yes  [x]  No        If Yes:  Date Range for reporting period:  Beginning- 2022   Ending    Progress report will be due (10 Rx or 30 days whichever is less): 10 visits or        Recertification will be due (POC Duration  / 90 days whichever is less): as above        Visit # Insurance Allowable Auth Required   2 30, needs auth after 30 visits []  Yes [x]  No        Functional Scale: WOMAC- 76.04%    Date assessed:  2022      Latex Allergy:  [x]NO      []YES  Preferred Language for Healthcare:   []English       [x]other:Cambodian- using [employer] ([employer]  [facility] #[id number], session [id number]) for tx      Pain level:  2/10  on 2022    SUBJECTIVE:  Pt reports she did exercises 3x/day every day. They are going well and no questions about them. She does feel like her knee is feeling better already. Her R sided back and hip pain are also feeling better since last session. OBJECTIVE: See eval   Observation:    Test measurements:      Joint mobility:               [x]? Normal []?Hypo              []?Hyper     Palpation: mild tender throughout R anterior knee, medial and lateral to patella. Non tender posterior knee.  Tender lateral hip proximal to greater trochanter     Functional Mobility/Transfers: Indep     Posture: mild increased knee valgus     Bandages/Dressings/Incisions: n/a     Gait: (include devices/WB status) Indep and non-antalgic     Single leg stance eyes open- R: 30 secs with HHA ;L: 30 secs with HHA no pain B              SLS eyes closed- R: ; L:      Squats: x 5 with good form     Quad recruitment: decreased tone and activation B           Flexibility L R Comment   Hamstring good good 90/90 position   Gastroc Good motion but pt reports feels tight Good motion but pt reports feels tight     ITB         Quad 1 in No pain in knee but pain into R side low back, 3 in Heel to buttock distance                             ROM PROM AROM Overpressure Comment     L R L R L R     Flexion     140 138         Extension     3 3                                                   Strength L R Comment   Quad 5 4+ mild pain posterior-lateral R hip     Hamstring 5 4+     Gastroc         Hip flexor 4+ 4; Mild pain posterior-lateral R hip     Hip ABD 4 4     Hip extn                      RESTRICTIONS/PRECAUTIONS: OA; Current R sided low back pain into her R glute    Exercises/Interventions:     Exercise/Equipment Resistance/Repetitions Other comments   Stretching     Hamstring     Hip Flexor     ITB     Grion     Quad     Inclined Calf Shown runners stretch or off side of step for HEP 3 x 30 secs    Towel Pull     Figure 4 stretch 3 x 30 secs B Painful stretch on first rep but improved to not painful on 2nd and 3rd rep   Sciatic nerve glide     SLR     Hip flexion 3 x 10 B Increased cuing to keep knee straight, difficult for pt to maintain   Hip extension     Hip Abduction Standing 3 x 10 B    Hip Adducton     SLR+     Clams 3 x 10 R Tactile cuing given throughout to improve form bridges 2 x 10  Cues for glute contraction             Isometrics     Quad sets     Hip Adduction 10 x 10 secs    Hamstring                    Patellar Glides     Medial     Superior     Inferior          ROM     Sheet Pulls     Wall Slides     Edge of bed     Flexionator     Extensionator     Hang Weights     Ankle Pumps                              CKC     Calf raises 1 x 10     Wall sits     Step ups     Step up and over     Lateral Step Downs               Mini squats 3 x 10    CC TKE          Lateral band walks     Side Planks     Half moon     Single leg dead lift          Biodex-balance     Single leg stance 3 x 15 secs B    Plyoback          Stool scoots     SB bridge     SB HS Curls     Planks          PRE     Extension  RANGE: 90/40   Flexion  RANGE: 0/90        Cable Column          Leg Press  RANGE: 70/10        Bike     Treadmill                 Patient education: Pt was educated on PT diagnosis, prognosis, and plan of care. Reviewed insurance benefits for physical therapy in an outpatient hospital based setting with the patient, including allowable visit number. Therapeutic Exercise and NMR EXR  [x] (66729) Provided verbal/tactile cueing for activities related to strengthening, flexibility, endurance, ROM for improvements in LE, proximal hip, and core control with self care, mobility, lifting, ambulation.  [] (01387) Provided verbal/tactile cueing for activities related to improving balance, coordination, kinesthetic sense, posture, motor skill, proprioception  to assist with LE, proximal hip, and core control in self care, mobility, lifting, ambulation and eccentric single leg control.      NMR and Therapeutic Activities:    [x] (93262 or 94266) Provided verbal/tactile cueing for activities related to improving balance, coordination, kinesthetic sense, posture, motor skill, proprioception and motor activation to allow for proper function of core, proximal hip and LE with self care and ADLs  [] (95410) Gait Re-education- Provided training and instruction to the patient for proper LE, core and proximal hip recruitment and positioning and eccentric body weight control with ambulation re-education including up and down stairs     Home Exercise Program:    [x] (02549) Reviewed/Progressed HEP activities related to strengthening, flexibility, endurance, ROM of core, proximal hip and LE for functional self-care, mobility, lifting and ambulation/stair navigation   [] (91328)Reviewed/Progressed HEP activities related to improving balance, coordination, kinesthetic sense, posture, motor skill, proprioception of core, proximal hip and LE for self care, mobility, lifting, and ambulation/stair navigation    Access Code: [email or web address]  URL: [email or web address]  Date: 01/31/2022  Prepared by: Malina Conway    Exercises  Gastroc Stretch on Wall - 2 x daily - 7 x weekly - 5 reps - 1 sets - 30 hold  Standing Gastroc Stretch on Step with Counter Support - 2 x daily - 7 x weekly - 1 sets - 5 reps - 30 hold  Supine Straight Leg Raises - 1 x daily - 7 x weekly - 10 reps - 3 sets  Clamshell - 1 x daily - 7 x weekly - 10 reps - 3 sets  Supine Hip Adduction Isometric with Ball - 1 x daily - 7 x weekly - 10 reps - 1 sets - 10 hold  Standing Hip Abduction with Counter Support - 1 x daily - 7 x weekly - 3 sets - 10 reps  Supine Bridge - 1 x daily - 7 x weekly - 5 reps - 3 sets - 3 hold  Single Leg Stance - 1 x daily - 7 x weekly - 1 reps - 5 sets - 30 hold  Mini Squat - 1 x daily - 7 x weekly - 3 sets - 10 reps    Manual Treatments:  PROM / STM / Oscillations-Mobs:  G-I, II, III, IV (PA's, Inf., Post.)  [] (96240) Provided manual therapy to mobilize LE, proximal hip and/or LS spine soft tissue/joints for the purpose of modulating pain, promoting relaxation,  increasing ROM, reducing/eliminating soft tissue swelling/inflammation/restriction, improving soft tissue extensibility and allowing for proper ROM for normal function with self care, mobility, lifting and ambulation. Modalities:      Charges:  Timed Code Treatment Minutes: 43   Total Treatment Minutes: 43     [] EVAL (LOW) 54746   [] EVAL (MOD) 54041   [] EVAL (HIGH) 98371   [] RE-EVAL   [x] CQ(29313) x   1  [] IONTO  [x] NMR (04294) x  1   [] VASO  [] Manual (48801) x      [] Other:  [x] TA x  1    [] Mech Traction (09735)  [] ES(attended) (61425)      [] ES (un) (16077):       GOALS: Patient stated goal: decrease pain in knee     Short Term Goals: To be achieved in: 2 weeks  1. Independent in HEP and progression per patient tolerance, in order to prevent re-injury. []? Progressing: []? Met: []? Not Met: []? Adjusted   2. Patient will have a decrease in pain to facilitate improvement in movement, function, and ADLs as indicated by Functional Deficits. []? Progressing: []? Met: []? Not Met: []? Adjusted     Long Term Goals: To be achieved in: 6 weeks  1. Disability index score of 62% or less for the [facility] to assist with reaching prior level of function. []? Progressing: []? Met: []? Not Met: []? Adjusted  2. Patient will demonstrate an increase in R knee strength to 5/5 flex and extn in LE to allow for proper functional mobility as indicated by patients Functional Deficits. []? Progressing: []? Met: []? Not Met: []? Adjusted  3. Patient will return to laying in bed at night with pain less than 3/10. []? Progressing: []? Met: []? Not Met: []? Adjusted  4. Pt will return to going up and down stairs with pain less than 3/10  []? Progressing: []? Met: []? Not Met: []? Adjusted          Overall Progression Towards Functional goals/ Treatment Progress Update:  [] Patient is progressing as expected towards functional goals listed. [] Progression is slowed due to complexities/Impairments listed. [] Progression has been slowed due to co-morbidities.   [x] Plan just implemented, too soon to assess goals progression <30days   [] Goals require adjustment due to lack of progress  [] Patient is not progressing as expected and requires additional follow up with physician  [] Other    Prognosis for POC: [x] Good [] Fair  [] Poor      Patient requires continued skilled intervention: [x] Yes  [] No      ASSESSMENT:  See eval    Treatment/Activity Tolerance:  [x] Patient tolerated treatment well [] Patient limited by fatique  [] Patient limited by pain  [] Patient limited by other medical complications  [] Other: pt did need cuing throughout exercises to improve form. She did need cues to improve quad contraction and avoid extn lag on SLR. She also needed cues to go slower on several exercises and was cued for proper muscle activation throughout. Pt did initially report pain with first R sided piriformis stretch performed but did improve on 2nd and 3rd rep. She tolerated exercises well with no increase in pain throughout session. She was given a few new exercises to add to HEP. Pt requests follow up just one more time and do exercises at home due to difficulty getting transportation to get to PT appts. Return to Play: (if applicable)   []  Stage 1: Intro to Strength   []  Stage 2: Return to Run and Strength   []  Stage 3: Return to Jump and Strength   []  Stage 4: Dynamic Strength and Agility   []  Stage 5: Sport Specific Training     []  Ready to Return to Play, Meets All Above Stages   []  Not Ready for Return to Sports   Comments:            Prognosis: [x] Good [] Fair  [] Poor    Patient Requires Follow-up: [x] Yes  [] No    PLAN: See eval; consider bike  [x] Continue per plan of care [] Alter current plan (see comments above)  [] Plan of care initiated [] Hold pending MD visit [] Discharge    Note: If patient does not return for scheduled/ recommended follow up visits, this note will serve as a discharge from care along with most recent update on progress.       Electronically signed by: Milena Salazar, PT PT, DPT

## 2022-02-01 ENCOUNTER — OFFICE VISIT (OUTPATIENT)
Dept: ORTHOPEDIC SURGERY | Age: 72
End: 2022-02-01
Payer: COMMERCIAL

## 2022-02-01 DIAGNOSIS — M25.561 ACUTE PAIN OF RIGHT KNEE: ICD-10-CM

## 2022-02-01 DIAGNOSIS — M22.41 CHONDROMALACIA PATELLAE OF RIGHT KNEE: ICD-10-CM

## 2022-02-01 DIAGNOSIS — M17.11 PRIMARY OSTEOARTHRITIS OF RIGHT KNEE: Primary | ICD-10-CM

## 2022-02-01 PROCEDURE — 20610 DRAIN/INJ JOINT/BURSA W/O US: CPT | Performed by: FAMILY MEDICINE

## 2022-02-01 RX ORDER — HYALURONATE SODIUM 10 MG/ML
20 SYRINGE (ML) INTRAARTICULAR ONCE
Status: COMPLETED | OUTPATIENT
Start: 2022-02-01 | End: 2022-02-01

## 2022-02-01 RX ADMIN — Medication 20 MG: at 10:04

## 2022-02-01 NOTE — PROGRESS NOTES
15 mg daily and has yet to start formal therapy. She has been utilizing her brace. She would like to consider viscosupplementation and is very opposed to considering surgical intervention      Medical History     Patient's medications, allergies, past medical, surgical, social and family histories were reviewed and updated as appropriate. Review of Systems  Pertinent items are noted in HPI  Review of systems reviewed from Patient History Form dated on 1/20/2022 and available in the patient's chart under the Media tab. Vital Signs  There were no vitals filed for this visit. General Exam:     Constitutional: Patient is adequately groomed with no evidence of malnutrition  DTRs: Deep tendon reflexes are intact  Mental Status: The patient is oriented to time, place and person. The patient's mood and affect are appropriate. Lymphatic: The lymphatic examination bilaterally reveals all areas to be without enlargement or induration. Vascular: Examination reveals no swelling or calf tenderness. Peripheral pulses are palpable and 2+. Neurological: The patient has good coordination. There is no weakness or sensory deficit. Knee Examination  Inspection: There is no high-grade deformity although she may be in slight valgus to about 10 degrees. Does have trace knee joint effusion with patellofemoral lateral compartment crepitation. Palpation: Does have improved clinical tenderness over the lateral greater than medial joint line remarkable for crepitation and tenderness over the medial and lateral patellofemoral facets. Rang of Motion: She is able to completely extend and flexion is less painful beyond 115. Hamstrings somewhat tight. Fair quad tone. Strength: Strength testing 4-5 with flexion extension. Special Tests: She does have much less prominent pain patellar grind testing reproducing portion of her pain and pain with crepitation with lateral greater than medial Robyn's.   No high-grade medial meniscal click. I do not sense high-grade instability. Hamstrings are mildly tight. Mild hip weakness as well. Skin: There are no rashes, ulcerations or lesions. Distal neurovascular exam is intact. Gait: Reasonable gait. Reflex symmetrically preserved    Additional Comments:     Additional Examinations:  Contralateral Exam: Examination of the left knee reveals intact skin. There is no focal tenderness. The patient demonstrates full painless range of motion with regards to flexion and extension. Strength is 5/5 thorough out all planes. Ligamentous stability is grossly intact. Right Lower Extremity: Examination of the right lower extremity does not show any tenderness, deformity or injury. Range of motion is unremarkable. There is no gross instability. There are no rashes, ulcerations or lesions. Strength and tone are normal.  Left Lower Extremity: Examination of the left lower extremity does not show any tenderness, deformity or injury. Range of motion is unremarkable. There is no gross instability. There are no rashes, ulcerations or lesions. Strength and tone are normal.      Diagnostic Test Findings: Right knee AP lateral and sunrise films were reviewed from Dr. Olsen Meals office at [facility] on 1/11/2022 does suggest patellofemoral arthropathy with some patella amy as well as lateral compartment spurring. We did do a weightbearing tunnel view in the office on 1/20/2022 which does show fairly advanced near bone-on-bone changes to her lateral compartment. Assessment : #1.  3.5-month status post symptomatically improving aggravation significant her right knee osteoarthritis with near bone-on-bone changes lateral compartment with patellofemoral arthropathy with right knee Euflexxa No. 1  Impression:  Encounter Diagnoses   Name Primary?     Primary osteoarthritis of right knee Yes    Chondromalacia patellae of right knee     Acute pain of right knee        Office Procedures:  Orders Placed This Encounter   Procedures    TN ARTHROCENTESIS ASPIR&/INJ MAJOR JT/BURSA W/O US       Treatment Plan:  Treatment options were discussed with Claire Molina. We did review her updated weightbearing knee films and exam findings. She does have fairly advanced lateral compartment arthropathy with symptomatic patellofemoral arthropathy to her right knee. Her degenerative changes are much more apparent on her weightbearing tunnel view. Presenting back today, her symptoms have improved substantially and she rates her pain at this point is only a 3 to 4-10 with distance walking climbing stairs with positional change. She is not having substantial rest or night pain. After discussion of pros and cons of viscosupplementation, she did receive her first injection of Euflexxa to the right knee. This was performed using a standard prefilled 2 cc syringe. She will continue with her wraparound knee brace as well as her meloxicam 15 mg daily. We will see her back each of the next 2 weeks to finish up her Euflexxa series. This is her first attempt at viscosupplementation and once again I did recommend therapy to ensure that she is properly performing her patella protection program.  Icing and activity modification was discussed. She will be seen back each next 2 weeks to finish up her Euflexxa series. This is her first attempt at viscosupplementation and they will contact us in the interim with questions or concerns.

## 2022-02-07 ENCOUNTER — HOSPITAL ENCOUNTER (OUTPATIENT)
Dept: PHYSICAL THERAPY | Age: 72
Setting detail: THERAPIES SERIES
Discharge: HOME | End: 2022-02-07
Payer: COMMERCIAL

## 2022-02-07 PROCEDURE — 97530 THERAPEUTIC ACTIVITIES: CPT | Performed by: PHYSICAL THERAPIST

## 2022-02-07 PROCEDURE — 97110 THERAPEUTIC EXERCISES: CPT | Performed by: PHYSICAL THERAPIST

## 2022-02-07 PROCEDURE — 97112 NEUROMUSCULAR REEDUCATION: CPT | Performed by: PHYSICAL THERAPIST

## 2022-02-07 NOTE — FLOWSHEET NOTE
[address] [place] [address] [facility], [place]                                                         Physical Therapy Daily Treatment Note  Date:  2022    Patient Name:  Izabela Younger    :  1950  MRN: [id number]    Medical/Treatment Diagnosis Information:  · Diagnosis: M25.561 (ICD-10-CM) - Acute pain of right kneeM17.11 (ICD-10-CM) - Primary osteoarthritis of right kneeM22.41 (ICD-10-CM) - Chondromalacia patellae of right knee  · Treatment Diagnosis: M25.561 (ICD-10-CM) - Acute pain of right knee; M62.81 muscle weakness  Insurance/Certification information:  PT Insurance Information: Franci Cotton  Physician Information:  Referring Practitioner: Jimi Sen MD  Has the plan of care been signed (Y/N):        [x]  Yes  [x]  No     Date of Patient follow up with Physician: few weeks      Is this a Progress Report:     []  Yes  [x]  No        If Yes:  Date Range for reporting period:  Beginning- 2022   Ending    Progress report will be due (10 Rx or 30 days whichever is less): 10 visits or 3/72/30       Recertification will be due (POC Duration  / 90 days whichever is less): as above        Visit # Insurance Allowable Auth Required   3 30, needs auth after 30 visits []  Yes [x]  No        Functional Scale: WOMAC- 76.04%    Date assessed:  2022      Latex Allergy:  [x]NO      []YES  Preferred Language for Healthcare:   []English       [x]other:[place]ian- using [employer] (audio  Corinn Perches [id number], session [id number]) for tx      Pain level:  4/10  on 2022    SUBJECTIVE: pt does state her knee feels a lot better. She has been doing her exercises at home and these are going well and doing better with these. She wants this to be her last visit and continue exercises at home due to difficulty getting ride to get to PT. She has had two injections into her knee so far.      OBJECTIVE: See eval   Observation:    Test and motor activation to allow for proper function of core, proximal hip and LE with self care and ADLs  [] (88962) Gait Re-education- Provided training and instruction to the patient for proper LE, core and proximal hip recruitment and positioning and eccentric body weight control with ambulation re-education including up and down stairs     Home Exercise Program:    [x] (18741) Reviewed/Progressed HEP activities related to strengthening, flexibility, endurance, ROM of core, proximal hip and LE for functional self-care, mobility, lifting and ambulation/stair navigation   [] (63520)Reviewed/Progressed HEP activities related to improving balance, coordination, kinesthetic sense, posture, motor skill, proprioception of core, proximal hip and LE for self care, mobility, lifting, and ambulation/stair navigation    Access Code: [email or web address]  URL: [email or web address]  Date: 01/31/2022  Prepared by: Arline Monsalve    Exercises  Gastroc Stretch on Wall - 2 x daily - 7 x weekly - 5 reps - 1 sets - 30 hold  Standing Gastroc Stretch on Step with Counter Support - 2 x daily - 7 x weekly - 1 sets - 5 reps - 30 hold  Supine Straight Leg Raises - 1 x daily - 7 x weekly - 10 reps - 3 sets  Clamshell - 1 x daily - 7 x weekly - 10 reps - 3 sets  Supine Hip Adduction Isometric with Ball - 1 x daily - 7 x weekly - 10 reps - 1 sets - 10 hold  Standing Hip Abduction with Counter Support - 1 x daily - 7 x weekly - 3 sets - 10 reps  Supine Bridge - 1 x daily - 7 x weekly - 5 reps - 3 sets - 3 hold  Single Leg Stance - 1 x daily - 7 x weekly - 1 reps - 5 sets - 30 hold  Mini Squat - 1 x daily - 7 x weekly - 3 sets - 10 reps  Access Code: [email or web address]  URL: [email or web address]  Date: 02/07/2022  Prepared by: Arline Monsalve    Exercises  Wall Sit - 1 x daily - 7 x weekly - 1 reps - 3 sets - 30 hold    Manual Treatments:  PROM / STM / Oscillations-Mobs:  G-I, II, III, IV (PA's, Inf., Post.)  [] (91974) Provided manual therapy to mobilize LE, proximal hip and/or LS spine soft tissue/joints for the purpose of modulating pain, promoting relaxation,  increasing ROM, reducing/eliminating soft tissue swelling/inflammation/restriction, improving soft tissue extensibility and allowing for proper ROM for normal function with self care, mobility, lifting and ambulation. Modalities:      Charges:  Timed Code Treatment Minutes: 43   Total Treatment Minutes: 44     [] EVAL (LOW) 38746   [] EVAL (MOD) 78696   [] EVAL (HIGH) 33165   [] RE-EVAL   [x] KI(35355) x   1  [] IONTO  [x] NMR (13603) x  1   [] VASO  [] Manual (47758) x      [] Other:  [x] TA x  1    [] Mech Traction (63828)  [] ES(attended) (36120)      [] ES (un) (18296):       GOALS: Patient stated goal: decrease pain in knee     Short Term Goals: To be achieved in: 2 weeks  1. Independent in HEP and progression per patient tolerance, in order to prevent re-injury. []? Progressing: [x]? Met: []? Not Met: []? Adjusted   2. Patient will have a decrease in pain to facilitate improvement in movement, function, and ADLs as indicated by Functional Deficits. []? Progressing: [x]? Met: []? Not Met: []? Adjusted     Long Term Goals: To be achieved in: 6 weeks- not assessed  1. Disability index score of 62% or less for the [facility] to assist with reaching prior level of function. []? Progressing: []? Met: []? Not Met: []? Adjusted  2. Patient will demonstrate an increase in R knee strength to 5/5 flex and extn in LE to allow for proper functional mobility as indicated by patients Functional Deficits. []? Progressing: []? Met: []? Not Met: []? Adjusted  3. Patient will return to laying in bed at night with pain less than 3/10. []? Progressing: []? Met: []? Not Met: []? Adjusted  4. Pt will return to going up and down stairs with pain less than 3/10  []? Progressing: []? Met: []? Not Met: []?  Adjusted          Overall Progression Towards Functional goals/ Treatment Progress Update:  [x] Patient is progressing as expected towards functional goals listed. [] Progression is slowed due to complexities/Impairments listed. [] Progression has been slowed due to co-morbidities. [] Plan just implemented, too soon to assess goals progression <30days   [] Goals require adjustment due to lack of progress  [] Patient is not progressing as expected and requires additional follow up with physician  [] Other    Prognosis for POC: [x] Good [] Fair  [] Poor      Patient requires continued skilled intervention: [x] Yes  [] No      ASSESSMENT:  See eval    Treatment/Activity Tolerance:  [x] Patient tolerated treatment well [] Patient limited by fatique  [] Patient limited by pain  [] Patient limited by other medical complications  [x] Other:  Pt has done well progressing in therapy. She does report decreased knee pain since starting therapy and states she has less pain in knee throughout the day and less tightness in calves. She has been doing well with her exercises in session and at home. Did review form on exercises today and added TB to calms and hip abd B. She was also able to add in wall sits and tolerated well with cuing to improve form throughout. She will add this to HEP only every other day while continuing with other exercises daily. She wants to make this her last visit due to difficulty getting ride to therapy. She was educated on continuing to her HEP for at least another 1-2 months and pt agreed. She will now be d/c to HEP but return as needed.        Return to Play: (if applicable)   []  Stage 1: Intro to Strength   []  Stage 2: Return to Run and Strength   []  Stage 3: Return to Jump and Strength   []  Stage 4: Dynamic Strength and Agility   []  Stage 5: Sport Specific Training     []  Ready to Return to Play, Meets All Above Stages   []  Not Ready for Return to Sports   Comments:            Prognosis: [x] Good [] Fair  [] Poor    Patient Requires Follow-up: [x] Yes  [] No    PLAN:    [] Continue per plan of care [] Alter current plan (see comments above)  [] Plan of care initiated [] Hold pending MD visit [] Discharge    Note: If patient does not return for scheduled/ recommended follow up visits, this note will serve as a discharge from care along with most recent update on progress.       Electronically signed by: Al Jacob PT, DPT

## 2022-02-08 ENCOUNTER — OFFICE VISIT (OUTPATIENT)
Dept: ORTHOPEDIC SURGERY | Age: 72
End: 2022-02-08
Payer: COMMERCIAL

## 2022-02-08 DIAGNOSIS — M25.561 ACUTE PAIN OF RIGHT KNEE: ICD-10-CM

## 2022-02-08 DIAGNOSIS — M17.11 PRIMARY OSTEOARTHRITIS OF RIGHT KNEE: Primary | ICD-10-CM

## 2022-02-08 DIAGNOSIS — M22.41 CHONDROMALACIA PATELLAE OF RIGHT KNEE: ICD-10-CM

## 2022-02-08 PROCEDURE — 20610 DRAIN/INJ JOINT/BURSA W/O US: CPT | Performed by: FAMILY MEDICINE

## 2022-02-08 RX ORDER — HYALURONATE SODIUM 10 MG/ML
20 SYRINGE (ML) INTRAARTICULAR ONCE
Status: COMPLETED | OUTPATIENT
Start: 2022-02-08 | End: 2022-02-08

## 2022-02-08 RX ADMIN — Medication 20 MG: at 10:12

## 2022-02-08 NOTE — PROGRESS NOTES
CC:  FU Knee Osteoarthritis with Viscosupplementation      History of Present Illness:  Claire Molina is a 80 y.o. female is a very pleasant  female who speaks primarily [place] who is recently retired after working in a [employer]. and is a very nice patient of Dr. López Curry who is being seen today in kind consultation from Dr. López Curry for evaluation of persistence of pain to the right anterior and lateral greater than medial knee pain. She states that since roughly October 2021, she began noticed the insidious onset of pain to the anterior lateral greater than medial portion of her right knee. There is no history of injury trauma or fall prior to becoming symptomatic. She describes her pain is more achy but is a difficult time rating this with more of her sharp pain associated with positional changes after sitting climbing stairs and if she is unable to squat kneel due to pain. She did recently see Dr. López Curry in the office who did obtain a 3 view knee films which did suggest more mild arthropathy. She has had little in the way of treatment and does complain of crepitation and popping and has had subtle buckling and cracking but no active locking or catching. Being seen today for orthopedic and sports consultation with updated weightbearing imaging. Kostas Dye was initially evaluated in the office on 1/20/2022 and was found to have fairly advanced bone-on-bone changes to the lateral compartment of her right knee with patellofemoral arthropathy. She presents back today stating her pain symptoms have improved substantially to the point where she is now having only 3-4 out of 10 pain currently. She is not complaining of rest or night pain. She is trying to work on her home-based exercises and does utilize her brace. She has tolerated her meloxicam 15 mg daily and has yet to start formal therapy. She has been utilizing her brace.   She would like to consider viscosupplementation and is very opposed to considering surgical intervention. Rhonda Juarez was last seen in the office on 2/1/2022 for her fairly advanced bone-on-bone changes to the lateral compartment of her right knee. She is here today for second Euflexxa injection stating her knee pain symptoms are doing much better. She has had 3 sessions of therapy and has been diligent with her home-based exercise program and does utilize her brace. She is tolerating her meloxicam and denies locking catching or true instability symptoms. She is feeling much better is not having rest or night pain is doing much better with positional changes distance walking stairclimbing. She is pleased with her initial progress. PE: no substantial change in exam.    Assessment:  Knee Osteoarthritis with viscosupplementation      PROCEDURE NOTE:    PRE-PROCEDURE DIAGNOSIS: DJD knee    POST-PROCEDURE DIAGNOSIS: DJD knee    Injection #2 of Euflexxa. PROCEDURE:  With the patient's permission, her right knee was prepped in standard sterile fashion with Betadine and Alcohol and the prefilled 2cc injection of Euflexxa was injected intra-articularly into the right knee via a superolateral approach without difficulty. The patient tolerated this well without difficulty. A band-aid was applied. POST-PROCEDURE INSTRUCTIONS GIVEN TO PATIENT: The patient was advised to ice the knee for 15-20 minutes to relieve any injection site related pain. FOLLOW-UP: as directed. Continue with her meloxicam 15 mg daily as well as formal supervised therapy with use of her knee brace. She'll be seen back next week to finish up her Euflexxa injection series but will contact us in the interim with questions or concerns.   This is her first attempt at viscosupplementation she would like to avoid knee arthroplasty as long she can

## 2022-02-15 ENCOUNTER — OFFICE VISIT (OUTPATIENT)
Dept: ORTHOPEDIC SURGERY | Age: 72
End: 2022-02-15
Payer: COMMERCIAL

## 2022-02-15 DIAGNOSIS — M17.11 PRIMARY OSTEOARTHRITIS OF RIGHT KNEE: Primary | ICD-10-CM

## 2022-02-15 DIAGNOSIS — M22.41 CHONDROMALACIA PATELLAE OF RIGHT KNEE: ICD-10-CM

## 2022-02-15 DIAGNOSIS — M25.561 ACUTE PAIN OF RIGHT KNEE: ICD-10-CM

## 2022-02-15 PROCEDURE — 20610 DRAIN/INJ JOINT/BURSA W/O US: CPT | Performed by: FAMILY MEDICINE

## 2022-02-15 RX ORDER — HYALURONATE SODIUM 10 MG/ML
20 SYRINGE (ML) INTRAARTICULAR ONCE
Status: COMPLETED | OUTPATIENT
Start: 2022-02-15 | End: 2022-02-15

## 2022-02-15 RX ADMIN — Medication 20 MG: at 09:46

## 2022-02-15 NOTE — PROGRESS NOTES
CC:  FU Knee Osteoarthritis with Viscosupplementation      History of Present Illness:  Preeti Dominguez is a 80 y.o. female is a very pleasant  female who speaks primarily [place] who is recently retired after working in a [employer]. and is a very nice patient of Dr. Keyonna Leyva who is being seen today in kind consultation from Dr. Keyonna Leyva for evaluation of persistence of pain to the right anterior and lateral greater than medial knee pain. She states that since roughly October 2021, she began noticed the insidious onset of pain to the anterior lateral greater than medial portion of her right knee. There is no history of injury trauma or fall prior to becoming symptomatic. She describes her pain is more achy but is a difficult time rating this with more of her sharp pain associated with positional changes after sitting climbing stairs and if she is unable to squat kneel due to pain. She did recently see Dr. Keyonna Leyva in the office who did obtain a 3 view knee films which did suggest more mild arthropathy. She has had little in the way of treatment and does complain of crepitation and popping and has had subtle buckling and cracking but no active locking or catching. Being seen today for orthopedic and sports consultation with updated weightbearing imaging. Joann Hussein was initially evaluated in the office on 1/20/2022 and was found to have fairly advanced bone-on-bone changes to the lateral compartment of her right knee with patellofemoral arthropathy. She presents back today stating her pain symptoms have improved substantially to the point where she is now having only 3-4 out of 10 pain currently. She is not complaining of rest or night pain. She is trying to work on her home-based exercises and does utilize her brace. She has tolerated her meloxicam 15 mg daily and has yet to start formal therapy. She has been utilizing her brace.   She would like to consider viscosupplementation and is very opposed to considering surgical intervention. Yumiko Fofana was last seen in the office on 2/1/2022 for her fairly advanced bone-on-bone changes to the lateral compartment of her right knee. She is here today for second Euflexxa injection stating her knee pain symptoms are doing much better. She has had 3 sessions of therapy and has been diligent with her home-based exercise program and does utilize her brace. She is tolerating her meloxicam and denies locking catching or true instability symptoms. She is feeling much better is not having rest or night pain is doing much better with positional changes distance walking stairclimbing. She is pleased with her initial progress. Yumiko Fofana was last seen in the office on 2/8/2022 for continuation of Euflexxa for her advanced bone-on-bone changes lateral compartment of her right knee. Overall she has noticed a moderate improvement of her symptoms as tolerated meloxicam and denies locking or catching. She is working on her home-based exercise program and has continued with supervised therapy. Denies locking catching or true instability symptoms. She is not having a constant pain or pain at rest at this point. She still has discomfort going up and down stairs and with long distance walking. Once again this is her first attempt at viscosupplementation. PE: no substantial change in exam.    Assessment:  Knee Osteoarthritis with viscosupplementation      PROCEDURE NOTE:    PRE-PROCEDURE DIAGNOSIS: DJD knee    POST-PROCEDURE DIAGNOSIS: DJD knee    Injection #3 of Euflexxa. PROCEDURE:  With the patient's permission, her right knee was prepped in standard sterile fashion with Betadine and Alcohol and the prefilled 2cc injection of Euflexxa was injected intra-articularly into the right knee via a superolateral approach without difficulty. The patient tolerated this well without difficulty. A band-aid was applied.      POST-PROCEDURE INSTRUCTIONS GIVEN TO PATIENT: The patient was advised to ice the knee for 15-20 minutes to relieve any injection site related pain. FOLLOW-UP: as directed. Continue with her meloxicam 15 mg daily as well as formal supervised therapy with use of her knee brace. As this is her first attempt at viscosupplementation, like to see her back in 2 to 3 months. The importance of continue with her exercise program was discussed to build her strength. Once again she would like to avoid knee surgery as long as she can. She will contact us in the interim with questions or concerns.

## 2022-03-15 ENCOUNTER — CLINICAL DOCUMENTATION (OUTPATIENT)
Dept: OTHER | Age: 72
End: 2022-03-15

## 2022-05-10 ENCOUNTER — OFFICE VISIT (OUTPATIENT)
Dept: ORTHOPEDIC SURGERY | Age: 72
End: 2022-05-10
Payer: COMMERCIAL

## 2022-05-10 VITALS — HEIGHT: 62 IN | WEIGHT: 130 LBS | BODY MASS INDEX: 23.92 KG/M2

## 2022-05-10 DIAGNOSIS — M25.561 ACUTE PAIN OF RIGHT KNEE: ICD-10-CM

## 2022-05-10 DIAGNOSIS — M17.11 PRIMARY OSTEOARTHRITIS OF RIGHT KNEE: Primary | ICD-10-CM

## 2022-05-10 DIAGNOSIS — M22.41 CHONDROMALACIA PATELLAE OF RIGHT KNEE: ICD-10-CM

## 2022-05-10 PROCEDURE — 1123F ACP DISCUSS/DSCN MKR DOCD: CPT | Performed by: FAMILY MEDICINE

## 2022-05-10 PROCEDURE — 99213 OFFICE O/P EST LOW 20 MIN: CPT | Performed by: FAMILY MEDICINE

## 2022-05-10 PROCEDURE — 1090F PRES/ABSN URINE INCON ASSESS: CPT | Performed by: FAMILY MEDICINE

## 2022-05-10 PROCEDURE — 1036F TOBACCO NON-USER: CPT | Performed by: FAMILY MEDICINE

## 2022-05-10 PROCEDURE — 3017F COLORECTAL CA SCREEN DOC REV: CPT | Performed by: FAMILY MEDICINE

## 2022-05-10 PROCEDURE — G8420 CALC BMI NORM PARAMETERS: HCPCS | Performed by: FAMILY MEDICINE

## 2022-05-10 PROCEDURE — G8400 PT W/DXA NO RESULTS DOC: HCPCS | Performed by: FAMILY MEDICINE

## 2022-05-10 PROCEDURE — G8427 DOCREV CUR MEDS BY ELIG CLIN: HCPCS | Performed by: FAMILY MEDICINE

## 2022-05-10 PROCEDURE — 4040F PNEUMOC VAC/ADMIN/RCVD: CPT | Performed by: FAMILY MEDICINE

## 2022-05-10 NOTE — PROGRESS NOTES
Chief Complaint  Knee Pain (CK R KNEE)      Right knee advanced right knee osteoarthritis with near bone-on-bone changes lateral compartment with patellofemoral arthropathy status post completion of initial right knee Euflexxa 2/15/2022    History of Present Illness:  Nigel Newell is a 80 y.o. female is a very pleasant  female who speaks primarily [place] who is recently retired after working in a [employer]. and is a very nice patient of Dr. Kianna Choi who is being seen today in kind consultation from Dr. Kianna Choi for evaluation of persistence of pain to the right anterior and lateral greater than medial knee pain. She states that since roughly October 2021, she began noticed the insidious onset of pain to the anterior lateral greater than medial portion of her right knee. There is no history of injury trauma or fall prior to becoming symptomatic. She describes her pain is more achy but is a difficult time rating this with more of her sharp pain associated with positional changes after sitting climbing stairs and if she is unable to squat kneel due to pain. She did recently see Dr. Kianna Choi in the office who did obtain a 3 view knee films which did suggest more mild arthropathy. She has had little in the way of treatment and does complain of crepitation and popping and has had subtle buckling and cracking but no active locking or catching. Being seen today for orthopedic and sports consultation with updated weightbearing imaging. Mikayla Louie was initially evaluated in the office on 1/20/2022 and was found to have fairly advanced bone-on-bone changes to the lateral compartment of her right knee with patellofemoral arthropathy. She presents back today stating her pain symptoms have improved substantially to the point where she is now having only 3-4 out of 10 pain currently. She is not complaining of rest or night pain.   She is trying to work on her home-based exercises and does utilize her brace. She has tolerated her meloxicam 15 mg daily and has yet to start formal therapy. She has been utilizing her brace. She would like to consider viscosupplementation and is very opposed to considering surgical intervention    We last saw Zhang Goldsmith in the office on 2/15/2022 we finished up viscosupplementation to her right knee with Euflexxa. She presents back today stating that she is doing outstanding despite having fairly advanced bone-on-bone changes to the lateral compartment of her right knee with valgus alignment and patellofemoral arthropathy. She states she is no longer having pain and is back to her baseline but is trying to perform her exercise program as tolerated her meloxicam.  Denies locking catching or true instability symptoms and is currently very pleased with her progress. Denies rest or night pain and she is no longer having pain with distance walking and stair climbing. No locking catching or true instability symptoms and she would like to avoid surgery as long as she can. Medical History     Patient's medications, allergies, past medical, surgical, social and family histories were reviewed and updated as appropriate. Review of Systems  Pertinent items are noted in HPI  Review of systems reviewed from Patient History Form dated on 1/20/2022 and available in the patient's chart under the Media tab. Vital Signs  There were no vitals filed for this visit. General Exam:     Constitutional: Patient is adequately groomed with no evidence of malnutrition  DTRs: Deep tendon reflexes are intact  Mental Status: The patient is oriented to time, place and person. The patient's mood and affect are appropriate. Lymphatic: The lymphatic examination bilaterally reveals all areas to be without enlargement or induration. Vascular: Examination reveals no swelling or calf tenderness. Peripheral pulses are palpable and 2+. Neurological: The patient has good coordination. There is no weakness or sensory deficit. Knee Examination  Inspection: There is no high-grade deformity although she may be in slight valgus to about 10 degrees. Does have trace knee joint effusion with patellofemoral lateral compartment crepitation. Palpation: Does not demonstrate significant clinical tenderness over the lateral greater than medial joint line remarkable for crepitation and tenderness over the medial and lateral patellofemoral facets. Rang of Motion: She is able to completely extend and flexion is less painful beyond 115. Hamstrings somewhat tight. Fair quad tone. Strength: Strength testing 4-5 with flexion extension. Special Tests: She longer seems to demonstrate pain patellar grind testing reproducing portion of her pain and pain with crepitation with lateral greater than medial Robyn's. No high-grade medial meniscal click. I do not sense high-grade instability. Hamstrings are mildly tight. Mild hip weakness as well. Skin: There are no rashes, ulcerations or lesions. Distal neurovascular exam is intact. Gait: Reasonable gait. Reflex symmetrically preserved    Additional Comments:     Additional Examinations:  Contralateral Exam: Examination of the left knee reveals intact skin. There is no focal tenderness. The patient demonstrates full painless range of motion with regards to flexion and extension. Strength is 5/5 thorough out all planes. Ligamentous stability is grossly intact. Right Lower Extremity: Examination of the right lower extremity does not show any tenderness, deformity or injury. Range of motion is unremarkable. There is no gross instability. There are no rashes, ulcerations or lesions. Strength and tone are normal.  Left Lower Extremity: Examination of the left lower extremity does not show any tenderness, deformity or injury. Range of motion is unremarkable. There is no gross instability. There are no rashes, ulcerations or lesions. Strength and tone are normal.      Diagnostic Test Findings: Right knee AP lateral and sunrise films were reviewed from Dr. Forrest Winchester office at [place] on 1/11/2022 does suggest patellofemoral arthropathy with some patella amy as well as lateral compartment spurring. We did do a weightbearing tunnel view in the office on 1/20/2022 which does show fairly advanced near bone-on-bone changes to her lateral compartment. Assessment : #1.  6.5-month status post symptomatically improving aggravation significant her right knee osteoarthritis with near bone-on-bone changes lateral compartment with patellofemoral arthropathy status post completion of initial round of Euflexxa 2/15/2022. Patient clinically doing well at this time. Impression:  Encounter Diagnoses   Name Primary?  Primary osteoarthritis of right knee Yes    Chondromalacia patellae of right knee     Acute pain of right knee        Office Procedures:  No orders of the defined types were placed in this encounter. Treatment Plan:  Treatment options were discussed with Laverne Michael. We did review her updated weightbearing knee films and exam findings. She does have fairly advanced lateral compartment arthropathy with symptomatic patellofemoral arthropathy to her right knee. Her degenerative changes are much more apparent on her weightbearing tunnel view. Presenting back today, her symptoms have improved substantially and she states that she is back to baseline is no longer having pain with activities at rest or at night. She was drawn encouraged to continue with her exercise program with. Use of her knee brace and she does wish to try to wean off the meloxicam and seems to be more comfortable trying Voltaren gel which did we did show her in the office and printed out that she can try online getting this at [address] [place] [address].   Potential for repeating her viscosupplementation in 3 months was discussed although clinically at this point she is doing very well. Icing and activity modification was discussed. She will contact us in the interim with questions or concerns.

## 2022-07-18 DIAGNOSIS — M17.11 PRIMARY OSTEOARTHRITIS OF RIGHT KNEE: Primary | ICD-10-CM

## 2022-07-18 DIAGNOSIS — M22.41 CHONDROMALACIA PATELLAE OF RIGHT KNEE: ICD-10-CM

## 2022-07-18 DIAGNOSIS — M25.561 ACUTE PAIN OF RIGHT KNEE: ICD-10-CM

## 2022-08-12 ENCOUNTER — TELEPHONE (OUTPATIENT)
Dept: ORTHOPEDIC SURGERY | Age: 72
End: 2022-08-12

## 2022-08-12 NOTE — TELEPHONE ENCOUNTER
LVM for patient that euflexxa injections have been approved for right knee. Told patient they could call central scheduling at [phone] at their convenience to schedule those appointments. Also told them if they had any problems or questions, they could call me directly at [phone]     ELIGIBLE STARTING 8/16.  APPROVED THROUGH 2/16/23 WITH SAME INSURANCE

## 2023-10-03 ENCOUNTER — OFFICE VISIT (OUTPATIENT)
Dept: ORTHOPEDIC SURGERY | Age: 73
End: 2023-10-03

## 2023-10-03 VITALS — WEIGHT: 130 LBS | HEIGHT: 62 IN | BODY MASS INDEX: 23.92 KG/M2

## 2023-10-03 DIAGNOSIS — M17.0 BILATERAL PRIMARY OSTEOARTHRITIS OF KNEE: ICD-10-CM

## 2023-10-03 DIAGNOSIS — M22.42 CHONDROMALACIA PATELLAE OF LEFT KNEE: ICD-10-CM

## 2023-10-03 DIAGNOSIS — M25.561 ACUTE PAIN OF RIGHT KNEE: ICD-10-CM

## 2023-10-03 DIAGNOSIS — M65.9 SYNOVITIS OF BOTH KNEE JOINTS: ICD-10-CM

## 2023-10-03 DIAGNOSIS — M25.562 ACUTE PAIN OF LEFT KNEE: Primary | ICD-10-CM

## 2023-10-03 PROBLEM — M65.962 SYNOVITIS OF BOTH KNEE JOINTS: Status: ACTIVE | Noted: 2023-10-03

## 2023-10-03 PROBLEM — M65.961 SYNOVITIS OF BOTH KNEE JOINTS: Status: ACTIVE | Noted: 2023-10-03

## 2023-10-03 RX ORDER — MELOXICAM 15 MG/1
15 TABLET ORAL DAILY
Qty: 30 TABLET | Refills: 3 | Status: SHIPPED | OUTPATIENT
Start: 2023-10-03

## 2023-10-03 RX ORDER — BETAMETHASONE SODIUM PHOSPHATE AND BETAMETHASONE ACETATE 3; 3 MG/ML; MG/ML
24 INJECTION, SUSPENSION INTRA-ARTICULAR; INTRALESIONAL; INTRAMUSCULAR; SOFT TISSUE ONCE
Status: COMPLETED | OUTPATIENT
Start: 2023-10-03 | End: 2023-10-03

## 2023-10-03 RX ORDER — LIDOCAINE HYDROCHLORIDE 10 MG/ML
2 INJECTION, SOLUTION INFILTRATION; PERINEURAL ONCE
Status: COMPLETED | OUTPATIENT
Start: 2023-10-03 | End: 2023-10-03

## 2023-10-03 RX ORDER — BUPIVACAINE HYDROCHLORIDE 2.5 MG/ML
4 INJECTION, SOLUTION INFILTRATION; PERINEURAL ONCE
Status: COMPLETED | OUTPATIENT
Start: 2023-10-03 | End: 2023-10-03

## 2023-10-03 RX ADMIN — LIDOCAINE HYDROCHLORIDE 2 ML: 10 INJECTION, SOLUTION INFILTRATION; PERINEURAL at 15:09

## 2023-10-03 RX ADMIN — BETAMETHASONE SODIUM PHOSPHATE AND BETAMETHASONE ACETATE 24 MG: 3; 3 INJECTION, SUSPENSION INTRA-ARTICULAR; INTRALESIONAL; INTRAMUSCULAR; SOFT TISSUE at 15:09

## 2023-10-03 RX ADMIN — BUPIVACAINE HYDROCHLORIDE 10 MG: 2.5 INJECTION, SOLUTION INFILTRATION; PERINEURAL at 15:09

## 2023-10-03 NOTE — PROGRESS NOTES
sent her for x-rays which were suggestive of patellofemoral compartment and to compartment arthropathy and start her back on meloxicam.  Her pain is ranging between an ache at 2-4 out of 10 with sharper 6-7 out of 10 pain with distance walking and stair climbing. Squatting and stooping is also painful. She fortunately not having great deal of night pain but will have some occasional pain at rest and with positional changes. She is not really complaining of true locking or catching but has had some mild pseudo buckling. She is being seen today for orthopedic and sports consultation with review of her imaging. Medical History     Patient's medications, allergies, past medical, surgical, social and family histories were reviewed and updated as appropriate. Review of Systems  Pertinent items are noted in HPI  Review of systems reviewed from Patient History Form dated on 1/20/2022 and available in the patient's chart under the Media tab. Vital Signs  There were no vitals filed for this visit. General Exam:     Constitutional: Patient is adequately groomed with no evidence of malnutrition  DTRs: Deep tendon reflexes are intact  Mental Status: The patient is oriented to time, place and person. The patient's mood and affect are appropriate. Lymphatic: The lymphatic examination bilaterally reveals all areas to be without enlargement or induration. Vascular: Examination reveals no swelling or calf tenderness. Peripheral pulses are palpable and 2+. Neurological: The patient has good coordination. There is no weakness or sensory deficit. Knee Examination  Inspection: There is no high-grade deformity although she may be in slight valgus to about 10 degrees on the right. Does have trace knee joint effusion with patellofemoral lateral compartment crepitation.     Palpation: Does have moderate clinical tenderness over the lateral greater than medial joint line remarkable for crepitation and tenderness

## 2024-05-30 ENCOUNTER — OFFICE VISIT (OUTPATIENT)
Dept: ORTHOPEDIC SURGERY | Age: 74
End: 2024-05-30

## 2024-05-30 DIAGNOSIS — M25.562 PAIN IN BOTH KNEES, UNSPECIFIED CHRONICITY: Primary | ICD-10-CM

## 2024-05-30 DIAGNOSIS — M17.0 BILATERAL PRIMARY OSTEOARTHRITIS OF KNEE: ICD-10-CM

## 2024-05-30 DIAGNOSIS — M22.41 CHONDROMALACIA OF BOTH PATELLAE: ICD-10-CM

## 2024-05-30 DIAGNOSIS — M22.42 CHONDROMALACIA OF BOTH PATELLAE: ICD-10-CM

## 2024-05-30 DIAGNOSIS — M25.561 PAIN IN BOTH KNEES, UNSPECIFIED CHRONICITY: Primary | ICD-10-CM

## 2024-05-30 RX ORDER — BUPIVACAINE HYDROCHLORIDE 2.5 MG/ML
4 INJECTION, SOLUTION INFILTRATION; PERINEURAL ONCE
Status: COMPLETED | OUTPATIENT
Start: 2024-05-30 | End: 2024-05-30

## 2024-05-30 RX ORDER — BETAMETHASONE SODIUM PHOSPHATE AND BETAMETHASONE ACETATE 3; 3 MG/ML; MG/ML
24 INJECTION, SUSPENSION INTRA-ARTICULAR; INTRALESIONAL; INTRAMUSCULAR; SOFT TISSUE ONCE
Status: COMPLETED | OUTPATIENT
Start: 2024-05-30 | End: 2024-05-30

## 2024-05-30 RX ADMIN — BETAMETHASONE SODIUM PHOSPHATE AND BETAMETHASONE ACETATE 24 MG: 3; 3 INJECTION, SUSPENSION INTRA-ARTICULAR; INTRALESIONAL; INTRAMUSCULAR; SOFT TISSUE at 14:07

## 2024-05-30 RX ADMIN — Medication 2 ML: at 14:07

## 2024-05-30 RX ADMIN — BUPIVACAINE HYDROCHLORIDE 10 MG: 2.5 INJECTION, SOLUTION INFILTRATION; PERINEURAL at 14:07

## 2024-05-30 NOTE — PROGRESS NOTES
Chief Complaint  Knee Pain (CK L KNEE/OPNP R KNEE )    Initial consultation left anterior medial knee pain with difficulty walking distances and climbing stairs    Patient with history of right knee advanced right knee osteoarthritis with near bone-on-bone changes lateral compartment with patellofemoral arthropathy status post completion of initial right knee Euflexxa 2/15/2022    History of Present Illness:  Luzma Solis is a 73 y.o. 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 Suzie Tomlinson who is being seen today in kind consultation from Dr. Suzie Tomlinson for evaluation of persistence of pain to the right anterior and lateral greater than medial knee pain.  She states that since roughly October 2021, she began noticed the insidious onset of pain to the anterior lateral greater than medial portion of her right knee.  There is no history of injury trauma or fall prior to becoming symptomatic.  She describes her pain is more achy but is a difficult time rating this with more of her sharp pain associated with positional changes after sitting climbing stairs and if she is unable to squat kneel due to pain.  She did recently see Dr. Suzie Tomlinson in the office who did obtain a 3 view knee films which did suggest more mild arthropathy.  She has had little in the way of treatment and does complain of crepitation and popping and has had subtle buckling and cracking but no active locking or catching.  Being seen today for orthopedic and sports consultation with updated weightbearing imaging.    Luzma was initially evaluated in the office on 1/20/2022 and was found to have fairly advanced bone-on-bone changes to the lateral compartment of her right knee with patellofemoral arthropathy.  She presents back today stating her pain symptoms have improved substantially to the point where she is now

## 2024-06-03 DIAGNOSIS — M17.0 BILATERAL PRIMARY OSTEOARTHRITIS OF KNEE: ICD-10-CM

## 2024-06-03 DIAGNOSIS — M22.42 CHONDROMALACIA OF BOTH PATELLAE: ICD-10-CM

## 2024-06-03 DIAGNOSIS — M25.561 PAIN IN BOTH KNEES, UNSPECIFIED CHRONICITY: Primary | ICD-10-CM

## 2024-06-03 DIAGNOSIS — M22.41 CHONDROMALACIA OF BOTH PATELLAE: ICD-10-CM

## 2024-06-03 DIAGNOSIS — M25.562 PAIN IN BOTH KNEES, UNSPECIFIED CHRONICITY: Primary | ICD-10-CM

## 2024-06-04 ENCOUNTER — TELEPHONE (OUTPATIENT)
Dept: ORTHOPEDIC SURGERY | Age: 74
End: 2024-06-04

## 2024-06-04 NOTE — TELEPHONE ENCOUNTER
LVM for patient that orthovisc injections have been approved for bilateral knees. Told patient they could call central scheduling at [phone] at their convenience to schedule those appointments. Also told them if they had any problems or questions, they could call me directly at [phone]

## 2024-07-08 ENCOUNTER — TELEPHONE (OUTPATIENT)
Dept: ORTHOPEDIC SURGERY | Age: 74
End: 2024-07-08

## 2024-07-08 NOTE — TELEPHONE ENCOUNTER
Appointment Request     Patient requesting earlier appointment: No  Appointment offered to patient: #1 ORTHOVISC  OF 3/ BILATERAL KNEE/ WONDERING IF IT CAN BE EARLIER  Patient Contact Number: [phone]

## 2024-07-12 ENCOUNTER — TELEPHONE (OUTPATIENT)
Dept: ORTHOPEDIC SURGERY | Age: 74
End: 2024-07-12

## 2024-07-12 NOTE — TELEPHONE ENCOUNTER
General Question     Subject: I HAVE A QUESTION WILL DR. VILLEGAS DO A VIRTUAL VISIT FOR AND  THAT DOES NOT HAVE A [employer]  IF YES AFFORDABLE LANGUAGE WOULD NEED AND LINK FOR THE  SO THEY CAN GET ONE SCHEDULE FOR HER APPT ON 07/16/24 OR WILL DR. VILLEGAS BE ABLE TO USE THE [employer]. PLEASE ADVISE.  Patient Luzma Solis   Contact Number: [phone]

## 2024-07-16 ENCOUNTER — OFFICE VISIT (OUTPATIENT)
Dept: ORTHOPEDIC SURGERY | Age: 74
End: 2024-07-16
Payer: COMMERCIAL

## 2024-07-16 DIAGNOSIS — M22.41 CHONDROMALACIA OF BOTH PATELLAE: ICD-10-CM

## 2024-07-16 DIAGNOSIS — M17.0 BILATERAL PRIMARY OSTEOARTHRITIS OF KNEE: Primary | ICD-10-CM

## 2024-07-16 DIAGNOSIS — M22.42 CHONDROMALACIA OF BOTH PATELLAE: ICD-10-CM

## 2024-07-16 DIAGNOSIS — M25.562 PAIN IN BOTH KNEES, UNSPECIFIED CHRONICITY: ICD-10-CM

## 2024-07-16 DIAGNOSIS — M25.561 PAIN IN BOTH KNEES, UNSPECIFIED CHRONICITY: ICD-10-CM

## 2024-07-16 PROCEDURE — 20610 DRAIN/INJ JOINT/BURSA W/O US: CPT | Performed by: FAMILY MEDICINE

## 2024-07-16 PROCEDURE — 4004F PT TOBACCO SCREEN RCVD TLK: CPT | Performed by: FAMILY MEDICINE

## 2024-07-16 PROCEDURE — 3017F COLORECTAL CA SCREEN DOC REV: CPT | Performed by: FAMILY MEDICINE

## 2024-07-16 PROCEDURE — G8400 PT W/DXA NO RESULTS DOC: HCPCS | Performed by: FAMILY MEDICINE

## 2024-07-16 PROCEDURE — G8420 CALC BMI NORM PARAMETERS: HCPCS | Performed by: FAMILY MEDICINE

## 2024-07-16 PROCEDURE — 1090F PRES/ABSN URINE INCON ASSESS: CPT | Performed by: FAMILY MEDICINE

## 2024-07-16 PROCEDURE — 99213 OFFICE O/P EST LOW 20 MIN: CPT | Performed by: FAMILY MEDICINE

## 2024-07-16 PROCEDURE — G8428 CUR MEDS NOT DOCUMENT: HCPCS | Performed by: FAMILY MEDICINE

## 2024-07-16 PROCEDURE — 1123F ACP DISCUSS/DSCN MKR DOCD: CPT | Performed by: FAMILY MEDICINE

## 2024-07-16 NOTE — PROGRESS NOTES
Chief Complaint  Knee Pain (ORTHOVISC #1 AYAN KNEES )    FU left anterior medial knee pain with difficulty walking distances and climbing stairs with right knee medial compartment osteoarthritis patellofemoral arthropathy    Patient with history of right knee advanced right knee osteoarthritis with near bone-on-bone changes lateral compartment with patellofemoral arthropathy status post completion of initial right knee Euflexxa 2/15/2022    History of Present Illness:  Luzma Solis is a 73 y.o. 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 Suzie Tomlinson who is being seen today in kind consultation from Dr. Suzie Tomlinson for evaluation of persistence of pain to the right anterior and lateral greater than medial knee pain.  She states that since roughly October 2021, she began noticed the insidious onset of pain to the anterior lateral greater than medial portion of her right knee.  There is no history of injury trauma or fall prior to becoming symptomatic.  She describes her pain is more achy but is a difficult time rating this with more of her sharp pain associated with positional changes after sitting climbing stairs and if she is unable to squat kneel due to pain.  She did recently see Dr. Suzie Tomlinson in the office who did obtain a 3 view knee films which did suggest more mild arthropathy.  She has had little in the way of treatment and does complain of crepitation and popping and has had subtle buckling and cracking but no active locking or catching.  Being seen today for orthopedic and sports consultation with updated weightbearing imaging.    Luzma was initially evaluated in the office on 1/20/2022 and was found to have fairly advanced bone-on-bone changes to the lateral compartment of her right knee with patellofemoral arthropathy.  She presents back today stating her pain symptoms

## 2024-07-23 ENCOUNTER — OFFICE VISIT (OUTPATIENT)
Dept: ORTHOPEDIC SURGERY | Age: 74
End: 2024-07-23
Payer: COMMERCIAL

## 2024-07-23 VITALS — WEIGHT: 130 LBS | BODY MASS INDEX: 23.92 KG/M2 | HEIGHT: 62 IN

## 2024-07-23 DIAGNOSIS — M25.561 PAIN IN BOTH KNEES, UNSPECIFIED CHRONICITY: ICD-10-CM

## 2024-07-23 DIAGNOSIS — M22.42 CHONDROMALACIA OF BOTH PATELLAE: ICD-10-CM

## 2024-07-23 DIAGNOSIS — M25.562 PAIN IN BOTH KNEES, UNSPECIFIED CHRONICITY: ICD-10-CM

## 2024-07-23 DIAGNOSIS — M17.0 BILATERAL PRIMARY OSTEOARTHRITIS OF KNEE: Primary | ICD-10-CM

## 2024-07-23 DIAGNOSIS — M22.41 CHONDROMALACIA OF BOTH PATELLAE: ICD-10-CM

## 2024-07-23 PROCEDURE — 20610 DRAIN/INJ JOINT/BURSA W/O US: CPT | Performed by: FAMILY MEDICINE

## 2024-07-23 NOTE — PROGRESS NOTES
CC:  FU Knee Osteoarthritis with Viscosupplementation        History of Present Illness:  Luzma Solis is a 71 y.o. 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 Suzie Tomlinson who is being seen today in kind consultation from Dr. Suzie Tomlinson for evaluation of persistence of pain to the right anterior and lateral greater than medial knee pain.  She states that since roughly October 2021, she began noticed the insidious onset of pain to the anterior lateral greater than medial portion of her right knee.  There is no history of injury trauma or fall prior to becoming symptomatic.  She describes her pain is more achy but is a difficult time rating this with more of her sharp pain associated with positional changes after sitting climbing stairs and if she is unable to squat kneel due to pain.  She did recently see Dr. Suzie Tomlinson in the office who did obtain a 3 view knee films which did suggest more mild arthropathy.  She has had little in the way of treatment and does complain of crepitation and popping and has had subtle buckling and cracking but no active locking or catching.  Being seen today for orthopedic and sports consultation with updated weightbearing imaging.    Luzma was initially evaluated in the office on 1/20/2022 and was found to have fairly advanced bone-on-bone changes to the lateral compartment of her right knee with patellofemoral arthropathy.  She presents back today stating her pain symptoms have improved substantially to the point where she is now having only 3-4 out of 10 pain currently.  She is not complaining of rest or night pain.  She is trying to work on her home-based exercises and does utilize her brace.  She has tolerated her meloxicam 15 mg daily and has yet to start formal therapy.  She has been utilizing her brace.  She would like to consider

## 2024-07-30 ENCOUNTER — OFFICE VISIT (OUTPATIENT)
Dept: ORTHOPEDIC SURGERY | Age: 74
End: 2024-07-30
Payer: COMMERCIAL

## 2024-07-30 DIAGNOSIS — M25.562 PAIN IN BOTH KNEES, UNSPECIFIED CHRONICITY: ICD-10-CM

## 2024-07-30 DIAGNOSIS — M22.41 CHONDROMALACIA OF BOTH PATELLAE: ICD-10-CM

## 2024-07-30 DIAGNOSIS — M25.561 PAIN IN BOTH KNEES, UNSPECIFIED CHRONICITY: ICD-10-CM

## 2024-07-30 DIAGNOSIS — M22.42 CHONDROMALACIA OF BOTH PATELLAE: ICD-10-CM

## 2024-07-30 DIAGNOSIS — M17.0 BILATERAL PRIMARY OSTEOARTHRITIS OF KNEE: Primary | ICD-10-CM

## 2024-07-30 PROCEDURE — 20610 DRAIN/INJ JOINT/BURSA W/O US: CPT | Performed by: FAMILY MEDICINE

## 2024-12-03 ENCOUNTER — OFFICE VISIT (OUTPATIENT)
Dept: ORTHOPEDIC SURGERY | Age: 74
End: 2024-12-03
Payer: COMMERCIAL

## 2024-12-03 DIAGNOSIS — M25.561 PAIN IN BOTH KNEES, UNSPECIFIED CHRONICITY: ICD-10-CM

## 2024-12-03 DIAGNOSIS — M22.41 CHONDROMALACIA OF BOTH PATELLAE: ICD-10-CM

## 2024-12-03 DIAGNOSIS — M25.562 PAIN IN BOTH KNEES, UNSPECIFIED CHRONICITY: ICD-10-CM

## 2024-12-03 DIAGNOSIS — M22.42 CHONDROMALACIA OF BOTH PATELLAE: ICD-10-CM

## 2024-12-03 DIAGNOSIS — M17.0 BILATERAL PRIMARY OSTEOARTHRITIS OF KNEE: Primary | ICD-10-CM

## 2024-12-03 PROCEDURE — 1090F PRES/ABSN URINE INCON ASSESS: CPT | Performed by: FAMILY MEDICINE

## 2024-12-03 PROCEDURE — 99213 OFFICE O/P EST LOW 20 MIN: CPT | Performed by: FAMILY MEDICINE

## 2024-12-03 PROCEDURE — G8400 PT W/DXA NO RESULTS DOC: HCPCS | Performed by: FAMILY MEDICINE

## 2024-12-03 PROCEDURE — 4004F PT TOBACCO SCREEN RCVD TLK: CPT | Performed by: FAMILY MEDICINE

## 2024-12-03 PROCEDURE — 1123F ACP DISCUSS/DSCN MKR DOCD: CPT | Performed by: FAMILY MEDICINE

## 2024-12-03 PROCEDURE — G8420 CALC BMI NORM PARAMETERS: HCPCS | Performed by: FAMILY MEDICINE

## 2024-12-03 PROCEDURE — G8484 FLU IMMUNIZE NO ADMIN: HCPCS | Performed by: FAMILY MEDICINE

## 2024-12-03 PROCEDURE — 3017F COLORECTAL CA SCREEN DOC REV: CPT | Performed by: FAMILY MEDICINE

## 2024-12-03 PROCEDURE — G8428 CUR MEDS NOT DOCUMENT: HCPCS | Performed by: FAMILY MEDICINE

## 2024-12-03 PROCEDURE — 20610 DRAIN/INJ JOINT/BURSA W/O US: CPT | Performed by: FAMILY MEDICINE

## 2024-12-03 RX ORDER — BUPIVACAINE HYDROCHLORIDE 2.5 MG/ML
4 INJECTION, SOLUTION INFILTRATION; PERINEURAL ONCE
Status: COMPLETED | OUTPATIENT
Start: 2024-12-03 | End: 2024-12-03

## 2024-12-03 RX ORDER — MELOXICAM 15 MG/1
15 TABLET ORAL DAILY
Qty: 30 TABLET | Refills: 3 | Status: SHIPPED | OUTPATIENT
Start: 2024-12-03

## 2024-12-03 RX ORDER — BETAMETHASONE SODIUM PHOSPHATE AND BETAMETHASONE ACETATE 3; 3 MG/ML; MG/ML
24 INJECTION, SUSPENSION INTRA-ARTICULAR; INTRALESIONAL; INTRAMUSCULAR; SOFT TISSUE ONCE
Status: COMPLETED | OUTPATIENT
Start: 2024-12-03 | End: 2024-12-03

## 2024-12-03 RX ADMIN — Medication 2 ML: at 14:40

## 2024-12-03 RX ADMIN — BETAMETHASONE SODIUM PHOSPHATE AND BETAMETHASONE ACETATE 24 MG: 3; 3 INJECTION, SUSPENSION INTRA-ARTICULAR; INTRALESIONAL; INTRAMUSCULAR; SOFT TISSUE at 14:39

## 2024-12-03 RX ADMIN — BUPIVACAINE HYDROCHLORIDE 10 MG: 2.5 INJECTION, SOLUTION INFILTRATION; PERINEURAL at 14:40

## 2024-12-03 NOTE — PROGRESS NOTES
Chief Complaint  Knee Pain (CK AYAN KNEES/REQ CORTISONE )    FU left anterior medial knee pain with difficulty walking distances and climbing stairs with right knee medial compartment osteoarthritis patellofemoral arthropathy status post completion of left knee Euflexxa 7/30/2024    Patient with history of right knee advanced right knee osteoarthritis with near bone-on-bone changes lateral compartment with patellofemoral arthropathy status post completion of initial right knee Euflexxa 7/30/2024    History of Present Illness:  Luzma Solis is a 74 y.o. 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 Suzie Tomlinson who is being seen today in kind consultation from Dr. Suzie Tomlinson for evaluation of persistence of pain to the right anterior and lateral greater than medial knee pain.  She states that since roughly October 2021, she began noticed the insidious onset of pain to the anterior lateral greater than medial portion of her right knee.  There is no history of injury trauma or fall prior to becoming symptomatic.  She describes her pain is more achy but is a difficult time rating this with more of her sharp pain associated with positional changes after sitting climbing stairs and if she is unable to squat kneel due to pain.  She did recently see Dr. Suzie Tomlinson in the office who did obtain a 3 view knee films which did suggest more mild arthropathy.  She has had little in the way of treatment and does complain of crepitation and popping and has had subtle buckling and cracking but no active locking or catching.  Being seen today for orthopedic and sports consultation with updated weightbearing imaging.    Luzma was initially evaluated in the office on 1/20/2022 and was found to have fairly advanced bone-on-bone changes to the lateral compartment of her right knee with patellofemoral

## 2025-01-10 DIAGNOSIS — M25.561 PAIN IN BOTH KNEES, UNSPECIFIED CHRONICITY: ICD-10-CM

## 2025-01-10 DIAGNOSIS — M17.0 BILATERAL PRIMARY OSTEOARTHRITIS OF KNEE: Primary | ICD-10-CM

## 2025-01-10 DIAGNOSIS — M22.41 CHONDROMALACIA OF BOTH PATELLAE: ICD-10-CM

## 2025-01-10 DIAGNOSIS — M22.42 CHONDROMALACIA OF BOTH PATELLAE: ICD-10-CM

## 2025-01-10 DIAGNOSIS — M25.562 PAIN IN BOTH KNEES, UNSPECIFIED CHRONICITY: ICD-10-CM

## 2025-01-21 DIAGNOSIS — M22.41 CHONDROMALACIA OF BOTH PATELLAE: ICD-10-CM

## 2025-01-21 DIAGNOSIS — M17.0 BILATERAL PRIMARY OSTEOARTHRITIS OF KNEE: Primary | ICD-10-CM

## 2025-01-21 DIAGNOSIS — M25.561 PAIN IN BOTH KNEES, UNSPECIFIED CHRONICITY: ICD-10-CM

## 2025-01-21 DIAGNOSIS — M22.42 CHONDROMALACIA OF BOTH PATELLAE: ICD-10-CM

## 2025-01-21 DIAGNOSIS — M25.562 PAIN IN BOTH KNEES, UNSPECIFIED CHRONICITY: ICD-10-CM

## 2025-01-24 ENCOUNTER — TELEPHONE (OUTPATIENT)
Dept: ORTHOPEDIC SURGERY | Age: 75
End: 2025-01-24

## 2025-01-24 NOTE — TELEPHONE ENCOUNTER
LVM for patient that orthovisc injections have been approved for BOTH knees. Told patient they could call central scheduling at [phone] at their convenience to schedule those appointments. Also told them if they had any problems or questions, they could call me directly at [phone]    Eligible 1/31/25 or after - advised in message to schedule the first week of February at [facility].

## 2025-02-13 ENCOUNTER — OFFICE VISIT (OUTPATIENT)
Dept: ORTHOPEDIC SURGERY | Age: 75
End: 2025-02-13

## 2025-02-13 DIAGNOSIS — M25.561 PAIN IN BOTH KNEES, UNSPECIFIED CHRONICITY: ICD-10-CM

## 2025-02-13 DIAGNOSIS — M22.42 CHONDROMALACIA OF BOTH PATELLAE: ICD-10-CM

## 2025-02-13 DIAGNOSIS — M25.562 PAIN IN BOTH KNEES, UNSPECIFIED CHRONICITY: ICD-10-CM

## 2025-02-13 DIAGNOSIS — M22.41 CHONDROMALACIA OF BOTH PATELLAE: ICD-10-CM

## 2025-02-13 DIAGNOSIS — M17.0 BILATERAL PRIMARY OSTEOARTHRITIS OF KNEE: Primary | ICD-10-CM

## 2025-02-13 NOTE — PROGRESS NOTES
her patella protection program.  Icing and activity modification was discussed and she will be seeing nature the next 2 weeks to finish up her Orthovisc injection series.  She has gotten benefit from this in the past.  She would like to avoid knee arthroplasty if at all possible.  She will contact us in the interim with questions or concerns

## 2025-02-20 ENCOUNTER — OFFICE VISIT (OUTPATIENT)
Dept: ORTHOPEDIC SURGERY | Age: 75
End: 2025-02-20

## 2025-02-20 VITALS — WEIGHT: 130 LBS | HEIGHT: 62 IN | BODY MASS INDEX: 23.92 KG/M2

## 2025-02-20 DIAGNOSIS — M17.0 BILATERAL PRIMARY OSTEOARTHRITIS OF KNEE: Primary | ICD-10-CM

## 2025-02-20 DIAGNOSIS — M25.562 PAIN IN BOTH KNEES, UNSPECIFIED CHRONICITY: ICD-10-CM

## 2025-02-20 DIAGNOSIS — M22.41 CHONDROMALACIA OF BOTH PATELLAE: ICD-10-CM

## 2025-02-20 DIAGNOSIS — M25.561 PAIN IN BOTH KNEES, UNSPECIFIED CHRONICITY: ICD-10-CM

## 2025-02-20 DIAGNOSIS — M22.42 CHONDROMALACIA OF BOTH PATELLAE: ICD-10-CM

## 2025-02-27 ENCOUNTER — OFFICE VISIT (OUTPATIENT)
Dept: ORTHOPEDIC SURGERY | Age: 75
End: 2025-02-27

## 2025-02-27 DIAGNOSIS — M22.41 CHONDROMALACIA OF BOTH PATELLAE: ICD-10-CM

## 2025-02-27 DIAGNOSIS — M22.42 CHONDROMALACIA OF BOTH PATELLAE: ICD-10-CM

## 2025-02-27 DIAGNOSIS — M25.561 PAIN IN BOTH KNEES, UNSPECIFIED CHRONICITY: ICD-10-CM

## 2025-02-27 DIAGNOSIS — M25.562 PAIN IN BOTH KNEES, UNSPECIFIED CHRONICITY: ICD-10-CM

## 2025-02-27 DIAGNOSIS — M17.0 BILATERAL PRIMARY OSTEOARTHRITIS OF KNEE: Primary | ICD-10-CM

## 2025-06-19 ENCOUNTER — OFFICE VISIT (OUTPATIENT)
Dept: ORTHOPEDIC SURGERY | Age: 75
End: 2025-06-19

## 2025-06-19 DIAGNOSIS — M22.41 CHONDROMALACIA OF BOTH PATELLAE: ICD-10-CM

## 2025-06-19 DIAGNOSIS — M65.961 SYNOVITIS OF BOTH KNEE JOINTS: ICD-10-CM

## 2025-06-19 DIAGNOSIS — M25.562 PAIN IN BOTH KNEES, UNSPECIFIED CHRONICITY: ICD-10-CM

## 2025-06-19 DIAGNOSIS — M22.42 CHONDROMALACIA OF BOTH PATELLAE: ICD-10-CM

## 2025-06-19 DIAGNOSIS — M25.561 PAIN IN BOTH KNEES, UNSPECIFIED CHRONICITY: ICD-10-CM

## 2025-06-19 DIAGNOSIS — M65.962 SYNOVITIS OF BOTH KNEE JOINTS: ICD-10-CM

## 2025-06-19 DIAGNOSIS — M17.0 BILATERAL PRIMARY OSTEOARTHRITIS OF KNEE: Primary | ICD-10-CM

## 2025-06-19 PROCEDURE — MISC915 KNEE SLEEVE OPEN OR CLOSED - BREG: Performed by: FAMILY MEDICINE

## 2025-06-19 RX ORDER — BETAMETHASONE SODIUM PHOSPHATE AND BETAMETHASONE ACETATE 3; 3 MG/ML; MG/ML
24 INJECTION, SUSPENSION INTRA-ARTICULAR; INTRALESIONAL; INTRAMUSCULAR; SOFT TISSUE ONCE
Status: COMPLETED | OUTPATIENT
Start: 2025-06-19 | End: 2025-06-19

## 2025-06-19 RX ORDER — BUPIVACAINE HYDROCHLORIDE 2.5 MG/ML
4 INJECTION, SOLUTION INFILTRATION; PERINEURAL ONCE
Status: COMPLETED | OUTPATIENT
Start: 2025-06-19 | End: 2025-06-19

## 2025-06-19 RX ADMIN — BUPIVACAINE HYDROCHLORIDE 10 MG: 2.5 INJECTION, SOLUTION INFILTRATION; PERINEURAL at 10:30

## 2025-06-19 RX ADMIN — BETAMETHASONE SODIUM PHOSPHATE AND BETAMETHASONE ACETATE 24 MG: 3; 3 INJECTION, SUSPENSION INTRA-ARTICULAR; INTRALESIONAL; INTRAMUSCULAR; SOFT TISSUE at 10:29

## 2025-06-19 RX ADMIN — Medication 2 ML: at 10:30

## 2025-06-19 NOTE — PROGRESS NOTES
Chief Complaint  No chief complaint on file.    FU left anterior medial knee pain with difficulty walking distances and climbing stairs with right knee medial compartment osteoarthritis patellofemoral arthropathy status post completion of left knee Euflexxa 7/30/2024    Patient with history of right knee advanced right knee osteoarthritis with near bone-on-bone changes lateral compartment with patellofemoral arthropathy status post completion of initial right knee Euflexxa 7/30/2024    History of Present Illness:  Luzma Solis is a 74 y.o. 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 Suzie Tomlinson who is being seen today in kind consultation from Dr. Suzie Tomlinson for evaluation of persistence of pain to the right anterior and lateral greater than medial knee pain.  She states that since roughly October 2021, she began noticed the insidious onset of pain to the anterior lateral greater than medial portion of her right knee.  There is no history of injury trauma or fall prior to becoming symptomatic.  She describes her pain is more achy but is a difficult time rating this with more of her sharp pain associated with positional changes after sitting climbing stairs and if she is unable to squat kneel due to pain.  She did recently see Dr. Suzie Tomlinson in the office who did obtain a 3 view knee films which did suggest more mild arthropathy.  She has had little in the way of treatment and does complain of crepitation and popping and has had subtle buckling and cracking but no active locking or catching.  Being seen today for orthopedic and sports consultation with updated weightbearing imaging.    Luzma was initially evaluated in the office on 1/20/2022 and was found to have fairly advanced bone-on-bone changes to the lateral compartment of her right knee with patellofemoral arthropathy.  She

## 2025-08-01 DIAGNOSIS — M25.561 PAIN IN BOTH KNEES, UNSPECIFIED CHRONICITY: ICD-10-CM

## 2025-08-01 DIAGNOSIS — M22.41 CHONDROMALACIA OF BOTH PATELLAE: ICD-10-CM

## 2025-08-01 DIAGNOSIS — M22.42 CHONDROMALACIA OF BOTH PATELLAE: ICD-10-CM

## 2025-08-01 DIAGNOSIS — M25.562 PAIN IN BOTH KNEES, UNSPECIFIED CHRONICITY: ICD-10-CM

## 2025-08-01 DIAGNOSIS — M17.0 BILATERAL PRIMARY OSTEOARTHRITIS OF KNEE: Primary | ICD-10-CM

## 2025-08-28 ENCOUNTER — OFFICE VISIT (OUTPATIENT)
Dept: ORTHOPEDIC SURGERY | Age: 75
End: 2025-08-28

## 2025-08-28 DIAGNOSIS — M22.41 CHONDROMALACIA OF BOTH PATELLAE: ICD-10-CM

## 2025-08-28 DIAGNOSIS — M25.561 PAIN IN BOTH KNEES, UNSPECIFIED CHRONICITY: ICD-10-CM

## 2025-08-28 DIAGNOSIS — M25.562 PAIN IN BOTH KNEES, UNSPECIFIED CHRONICITY: ICD-10-CM

## 2025-08-28 DIAGNOSIS — M17.0 BILATERAL PRIMARY OSTEOARTHRITIS OF KNEE: Primary | ICD-10-CM

## 2025-08-28 DIAGNOSIS — M22.42 CHONDROMALACIA OF BOTH PATELLAE: ICD-10-CM

## 2025-08-28 PROCEDURE — MISC915 KNEE SLEEVE OPEN OR CLOSED - BREG: Performed by: FAMILY MEDICINE

## 2025-09-02 ENCOUNTER — OFFICE VISIT (OUTPATIENT)
Dept: ORTHOPEDIC SURGERY | Age: 75
End: 2025-09-02
Payer: COMMERCIAL

## 2025-09-02 DIAGNOSIS — M22.42 CHONDROMALACIA OF BOTH PATELLAE: ICD-10-CM

## 2025-09-02 DIAGNOSIS — M22.41 CHONDROMALACIA OF BOTH PATELLAE: ICD-10-CM

## 2025-09-02 DIAGNOSIS — M25.561 PAIN IN BOTH KNEES, UNSPECIFIED CHRONICITY: ICD-10-CM

## 2025-09-02 DIAGNOSIS — M25.562 PAIN IN BOTH KNEES, UNSPECIFIED CHRONICITY: ICD-10-CM

## 2025-09-02 DIAGNOSIS — M17.0 BILATERAL PRIMARY OSTEOARTHRITIS OF KNEE: Primary | ICD-10-CM

## 2025-09-02 PROCEDURE — 20610 DRAIN/INJ JOINT/BURSA W/O US: CPT | Performed by: FAMILY MEDICINE
